# Patient Record
Sex: MALE | Race: WHITE | NOT HISPANIC OR LATINO | Employment: FULL TIME | URBAN - NONMETROPOLITAN AREA
[De-identification: names, ages, dates, MRNs, and addresses within clinical notes are randomized per-mention and may not be internally consistent; named-entity substitution may affect disease eponyms.]

---

## 2018-04-04 ENCOUNTER — APPOINTMENT (OUTPATIENT)
Dept: PREADMISSION TESTING | Facility: HOSPITAL | Age: 58
End: 2018-04-04

## 2018-04-04 ENCOUNTER — CONSULT (OUTPATIENT)
Dept: SURGERY | Facility: CLINIC | Age: 58
End: 2018-04-04

## 2018-04-04 VITALS
BODY MASS INDEX: 27.25 KG/M2 | HEIGHT: 69 IN | SYSTOLIC BLOOD PRESSURE: 126 MMHG | DIASTOLIC BLOOD PRESSURE: 74 MMHG | WEIGHT: 184 LBS

## 2018-04-04 VITALS
HEIGHT: 69 IN | OXYGEN SATURATION: 98 % | BODY MASS INDEX: 27.25 KG/M2 | HEART RATE: 93 BPM | RESPIRATION RATE: 18 BRPM | WEIGHT: 184 LBS | SYSTOLIC BLOOD PRESSURE: 130 MMHG | DIASTOLIC BLOOD PRESSURE: 80 MMHG

## 2018-04-04 DIAGNOSIS — K81.9 CHOLECYSTITIS: ICD-10-CM

## 2018-04-04 DIAGNOSIS — K81.9 CHOLECYSTITIS: Primary | ICD-10-CM

## 2018-04-04 LAB
ALBUMIN SERPL-MCNC: 4.2 G/DL (ref 3.4–4.8)
ALBUMIN/GLOB SERPL: 1.7 G/DL (ref 1.1–1.8)
ALP SERPL-CCNC: 67 U/L (ref 38–126)
ALT SERPL W P-5'-P-CCNC: 35 U/L (ref 21–72)
ANION GAP SERPL CALCULATED.3IONS-SCNC: 16 MMOL/L (ref 5–15)
AST SERPL-CCNC: 22 U/L (ref 17–59)
BILIRUB SERPL-MCNC: 0.3 MG/DL (ref 0.2–1.3)
BUN BLD-MCNC: 15 MG/DL (ref 7–21)
BUN/CREAT SERPL: 14.9 (ref 7–25)
CALCIUM SPEC-SCNC: 9.2 MG/DL (ref 8.4–10.2)
CHLORIDE SERPL-SCNC: 102 MMOL/L (ref 95–110)
CO2 SERPL-SCNC: 24 MMOL/L (ref 22–31)
CREAT BLD-MCNC: 1.01 MG/DL (ref 0.7–1.3)
GFR SERPL CREATININE-BSD FRML MDRD: 76 ML/MIN/1.73 (ref 56–130)
GLOBULIN UR ELPH-MCNC: 2.5 GM/DL (ref 2.3–3.5)
GLUCOSE BLD-MCNC: 112 MG/DL (ref 60–100)
POTASSIUM BLD-SCNC: 3.7 MMOL/L (ref 3.5–5.1)
PROT SERPL-MCNC: 6.7 G/DL (ref 6.3–8.6)
SODIUM BLD-SCNC: 142 MMOL/L (ref 137–145)

## 2018-04-04 PROCEDURE — 36415 COLL VENOUS BLD VENIPUNCTURE: CPT

## 2018-04-04 PROCEDURE — 93010 ELECTROCARDIOGRAM REPORT: CPT | Performed by: INTERNAL MEDICINE

## 2018-04-04 PROCEDURE — 93005 ELECTROCARDIOGRAM TRACING: CPT

## 2018-04-04 PROCEDURE — 80053 COMPREHEN METABOLIC PANEL: CPT | Performed by: SURGERY

## 2018-04-04 PROCEDURE — 99203 OFFICE O/P NEW LOW 30 MIN: CPT | Performed by: SURGERY

## 2018-04-04 RX ORDER — ACETAMINOPHEN 325 MG/1
650 TABLET ORAL ONCE
Status: CANCELLED | OUTPATIENT
Start: 2018-04-06 | End: 2018-04-06

## 2018-04-04 RX ORDER — FLUTICASONE PROPIONATE 50 MCG
2 SPRAY, SUSPENSION (ML) NASAL DAILY
COMMUNITY

## 2018-04-04 RX ORDER — SODIUM CHLORIDE, SODIUM LACTATE, POTASSIUM CHLORIDE, CALCIUM CHLORIDE 600; 310; 30; 20 MG/100ML; MG/100ML; MG/100ML; MG/100ML
100 INJECTION, SOLUTION INTRAVENOUS CONTINUOUS
Status: CANCELLED | OUTPATIENT
Start: 2018-04-06

## 2018-04-04 NOTE — PATIENT INSTRUCTIONS

## 2018-04-04 NOTE — PROGRESS NOTES
Chief Complaint   Patient presents with   • Abdominal Pain     Right upper quadrant pain and gallbladder polyps.        Abdominal Pain   This is a recurrent problem. The current episode started more than 1 month ago. The onset quality is gradual. The problem occurs intermittently. The problem has been gradually worsening. The pain is located in the epigastric region and RUQ. The patient is experiencing no pain. The quality of the pain is cramping and sharp. The abdominal pain does not radiate. Pertinent negatives include no anorexia, arthralgias, constipation, diarrhea, dysuria, fever, frequency, headaches, hematuria, myalgias, nausea or vomiting. Nothing aggravates the pain. The pain is relieved by nothing. He has tried nothing for the symptoms. Prior diagnostic workup includes ultrasound.         Multiple gallbladder polyps with no shadowing gallstones evident.    Otherwise unremarkable right upper quadrant ultrasound.   Result Narrative   Gallbladder/abdomen ultrasound    INDICATION: 57-year-old male with right upper quadrant pain, history of gallbladder polyps.    FINDINGS: Liver echogenicity is homogeneous with no mass or intrahepatic ductal dilatation seen. Numerous small echogenic non-mobile polyps are noted in the gallbladder. No definite shadowing stones, wall thickening or pericholecystic fluid is seen. The   common duct measures 0.3 cm. No sonographic abnormality is seen in the pancreas, the pancreatic tail is partially obscured by overlying bowel gas. Aortic caliber maximally measures 2.6 cm. The spleen measures 10.5 cm with no mass or splenomegaly evident.    The right kidney measures 10.7 cm. The left kidney measures 10.3 cm. No mass, hydronephrosis or shadowing calcifications are seen in the kidneys.    A previous June 2012 right upper quadrant ultrasound demonstrated gallbladder polyps as well.       No past medical history on file.    Past Surgical History:   Procedure Laterality Date   •  COLONOSCOPY     • CYST REMOVAL      ganglion right wrist   • ENDOSCOPY  02/28/2012    48885 (1)    Normal hypopharynx, esophagus, GE junction, duodenum, symmetrical & patent pylorus. Ulcers in antrum have healed. Thereis mild erythema that is present in antrum. Multiple biopsies were performed. 02/28/2012    n/a          Current Outpatient Prescriptions:   •  FISH OIL-KRILL OIL PO, Take 3 capsules by mouth Daily., Disp: , Rfl:   •  fluticasone (FLONASE) 50 MCG/ACT nasal spray, 2 sprays into each nostril Daily., Disp: , Rfl:   •  Ketotifen Fumarate (ALAWAY OP), Administer 1 drop to both eyes 2 (Two) Times a Day., Disp: , Rfl:   •  Multiple Vitamins-Minerals (MULTIVITAMIN MEN 50+ PO), Take 1 tablet by mouth Daily., Disp: , Rfl:   •  Unable to find, Daily. Med Name:calcium magnesium, Disp: , Rfl:     No Known Allergies    No family history on file.    Social History     Social History   • Marital status:      Spouse name: N/A   • Number of children: N/A   • Years of education: N/A     Occupational History   • Not on file.     Social History Main Topics   • Smoking status: Former Smoker     Quit date: 4/4/1988   • Smokeless tobacco: Former User     Quit date: 4/4/1988   • Alcohol use No   • Drug use: No   • Sexual activity: Defer     Other Topics Concern   • Not on file     Social History Narrative   • No narrative on file       Review of Systems   Constitutional: Negative for activity change, appetite change, chills and fever.   HENT: Negative for hearing loss, nosebleeds and trouble swallowing.    Cardiovascular: Negative for chest pain, palpitations and leg swelling.   Gastrointestinal: Positive for abdominal pain. Negative for abdominal distention, anal bleeding, anorexia, blood in stool, constipation, diarrhea, nausea, rectal pain and vomiting.   Endocrine: Negative for cold intolerance, heat intolerance, polydipsia and polyuria.   Genitourinary: Negative for decreased urine volume, difficulty urinating,  dysuria, enuresis, frequency, hematuria and urgency.   Musculoskeletal: Negative for arthralgias, back pain, gait problem, myalgias and neck pain.   Skin: Negative for pallor, rash and wound.   Allergic/Immunologic: Negative for immunocompromised state.   Neurological: Negative for dizziness, seizures, weakness, light-headedness, numbness and headaches.   Psychiatric/Behavioral: Negative for agitation and behavioral problems. The patient is not nervous/anxious.        Physical Exam   Constitutional: He is oriented to person, place, and time. He appears well-developed and well-nourished. No distress.   HENT:   Head: Normocephalic and atraumatic.   Eyes: EOM are normal. Pupils are equal, round, and reactive to light. No scleral icterus.   Neck: Normal range of motion. Neck supple. No JVD present. No tracheal deviation present. No thyromegaly present.   Cardiovascular: Normal rate and regular rhythm.    Pulmonary/Chest: Effort normal and breath sounds normal. No stridor.   Abdominal: Soft. Bowel sounds are normal. He exhibits no distension and no mass. There is no tenderness. There is no rebound and no guarding. No hernia.   Musculoskeletal: Normal range of motion. He exhibits no edema or deformity.   Lymphadenopathy:     He has no cervical adenopathy.   Neurological: He is oriented to person, place, and time.   Skin: Skin is warm and dry. He is not diaphoretic. No erythema.   Psychiatric: He has a normal mood and affect. His behavior is normal. Judgment and thought content normal.   Vitals reviewed.        ASSESSMENT    Manuel was seen today for abdominal pain.    Diagnoses and all orders for this visit:    Cholecystitis  -     Comprehensive Metabolic Panel; Future  -     ECG 12 Lead; Future  -     lactated ringers infusion; Infuse 100 mL/hr into a venous catheter Continuous.  -     ceFAZolin (ANCEF) 2 g in sodium chloride 0.9 % 100 mL IVPB; Infuse 2 g into a venous catheter 1 (One) Time.  -     acetaminophen  (TYLENOL) tablet 650 mg; Take 2 tablets by mouth 1 (One) Time.  -     Case Request; Standing  -     Case Request    Other orders  -     Provide instructions to patient on NPO status  -     Follow anesthesia standing orders.; Standing  -     Follow anesthesia standing orders.  -     Verify NPO Status; Standing  -     Obtain informed consent; Standing  -     SCD (sequential compression device)- to be placed on patient in Pre-op; Standing  -     Clip operative site; Standing        PLAN    1.  Laparoscopic possible open cholecystectomy was planned    What are the indications that have led your doctor to the opinion that an operation is necessary?    * Symptoms and studies indicate that there is gallbladder disease causing pain the abdomen.    What, if any, alternative treatments are available for your condition?    * Watching and waiting with no surgery  * Removing the gallbladder through one large incision made in the abdomen.    What will be the likely result if you don't have the operation?    * Pain, infection, inflammation, and/or stones may continue or get worse    What are the basic procedures involved in the operation?    * The surgery may be done laparoscopically. Laparoscopic surgery is done using a scope and hollow tube(s) called ports. These are inserted through small cuts in the abdomen. A scope is a thin, lighted instrument with a camera attached. Tools are passed through the ports. Carbon dioxide gas is pumped into the abdomen to create workspace.   If the surgery cannot be performed with a scope, it may be done through a larger cut. This occurs 2% of the time.  The gall bladder will be removed after it is  from the liver, bile duct, and surrounding arteries. An x-ray of the bile ducts is usually performed with contrast dye injected into the ducts.  If stones are found, another procedure may be required to remove them.  A drain may rarely be inserted to keep fluid from building up in the  treatment area.     What are the risks?    * Exposure to radiation. Pregnant women and women of childbearing age should talk with their doctor about this.  * Pain, numbness, swelling, weakness or scarring where tissue is cut.  * The gas used in laparoscopic procedures to inflate the abdomen can become trapped in tissues. Gas in the bloodstream can dangerously affect blood flow and  heart function.  * The procedure may not cure or relieve your condition or symptoms. They may come back and even worsen.  * You may need additional tests or treatment.  * Bleeding. You may need blood transfusions or other treatments. This may be discovered during the procedure, or later.  * Embolism. An embolism is an object that moves through your body in your bloodstream. It can be an air bubble, a blood clot, a piece of fat or other material. It can block a blood vessel. This can lead to stroke, pulmonary embolism (blockage of the main artery of the lung), or injury to organs or extremities.  * Reactions to dye used for imaging. These may include hives, swelling of the face and/or throat, difficulty breathing, and kidney failure.  * Retained stones in bile ducts.  * Wound infection, poor healing or reopening. Blood or clear fluid can also collect at the wound site(s).  * Damage to the bile ducts or nearby structures. This may be discovered during the procedure, or later.  * Incisional hernia. Weak scar tissue may allow tissue to bulge through the cut.  * The instrument(s) placed in your abdomen can cause injuries to nearby structures.  * Death.    How is the operation expected to improve your health or quality of life?    * Operation is expected to decrease pain and nausea/vomiting associated with gallstones.  * This procedure may relieve or prevent infection, inflammation, and/or pain from stones or blockage of bile ducts.    Is hospitalization necessary and, if so, how long can you expect to be hospitalized?    * Most often, the  operation is performed on an outpatient basis. Occasionally hospitalization is necessary for pain or nausea control    What can you expect during your recovery period?    * The gas used in laparoscopic procedures to inflate the abdomen can become trapped    in tissues. Shoulder pain may occur for a few days after surgery.   * Nausea and pain control are obtained with oral medication.  * If you are on metformin, it will need to be held for 48 hours after surgery    When can you expect to resume normal activities?    * Normal activity can be resumed in 10-14 days if the procedure is performed laparoscopically. Open operations require no lifting or straining for 6 weeks.     Are there likely to be residual effects from the operation?    * Diarrhea often occurs, mostly temporary. Occasionally there is still minor food intolerance.    All questions were answered. The patient agrees to operation.                This document has been electronically signed by Tyron Gonzalez MD on April 5, 2018 10:49 PM

## 2018-04-04 NOTE — DISCHARGE INSTRUCTIONS
[facility]  Pre-op Information and Guidelines    You will be called after 2 p.m. the day before your surgery (Friday for Monday surgery) and notified of your time for arrival and approximate surgery time.  If you have not received a call by 4P.M., please contact Same Day Surgery at [phone] of if outside [facility] call [phone].    Please Follow these Important Safety Guidelines:    • The morning of your procedure, take only the medications listed below with   A sip of water:_____________________________________________       ______________________________________________    • DO NOT eat or drink anything after 12:00 midnight the night before surgery  Specific instructions concerning drinking clear liquids will be discussed during  the pre-surgery instruction call the day before your surgery.    • If you take a blood thinner (ex. Plavix, Coumadin, aspirin), ask your doctor when to stop it before surgery  STOP DATE: _________________    • Only 2 visitors are allowed in patient rooms at a time  Your visitors will be asked to wait in the lobby until the admission process is complete with the exception of a parent with a child and patients in need of special assistance.    • YOU CANNOT DRIVE YOURSELF HOME  You must be accompanied by someone who will be responsible for driving you home after surgery and for your care at home.    • DO NOT chew gum, use breath mints, hard candy, or smoke the day of surgery  • DO NOT drink alcohol for at least 24 hours before your surgery  • DO NOT wear any jewelry and remove all body piercing before coming to the hospital  • DO NOT wear make-up to the hospital  • If you are having surgery on an extremity (arm/leg/foot) remove nail polish/artificial nails on the surgical side  • Clothing, glasses, contacts, dentures, and hairpieces must be removed before surgery  • Bathe the night before or the morning of your surgery and do not use powders/lotions on  skin.

## 2018-04-06 ENCOUNTER — APPOINTMENT (OUTPATIENT)
Dept: GENERAL RADIOLOGY | Facility: HOSPITAL | Age: 58
End: 2018-04-06

## 2018-04-06 ENCOUNTER — HOSPITAL ENCOUNTER (OUTPATIENT)
Facility: HOSPITAL | Age: 58
Setting detail: HOSPITAL OUTPATIENT SURGERY
Discharge: HOME | End: 2018-04-06
Attending: SURGERY | Admitting: SURGERY

## 2018-04-06 ENCOUNTER — ANESTHESIA (OUTPATIENT)
Dept: PERIOP | Facility: HOSPITAL | Age: 58
End: 2018-04-06

## 2018-04-06 ENCOUNTER — ANESTHESIA EVENT (OUTPATIENT)
Dept: PERIOP | Facility: HOSPITAL | Age: 58
End: 2018-04-06

## 2018-04-06 VITALS
HEIGHT: 69 IN | TEMPERATURE: 97.2 F | DIASTOLIC BLOOD PRESSURE: 67 MMHG | HEART RATE: 72 BPM | OXYGEN SATURATION: 97 % | WEIGHT: 185.63 LBS | RESPIRATION RATE: 18 BRPM | SYSTOLIC BLOOD PRESSURE: 109 MMHG | BODY MASS INDEX: 27.49 KG/M2

## 2018-04-06 DIAGNOSIS — K81.9 CHOLECYSTITIS: ICD-10-CM

## 2018-04-06 PROCEDURE — 47563 LAPARO CHOLECYSTECTOMY/GRAPH: CPT | Performed by: SURGERY

## 2018-04-06 PROCEDURE — 76000 FLUOROSCOPY <1 HR PHYS/QHP: CPT

## 2018-04-06 PROCEDURE — 74300 X-RAY BILE DUCTS/PANCREAS: CPT | Performed by: SURGERY

## 2018-04-06 PROCEDURE — 88304 TISSUE EXAM BY PATHOLOGIST: CPT | Performed by: PATHOLOGY

## 2018-04-06 PROCEDURE — 25010000002 FENTANYL CITRATE (PF) 250 MCG/5ML SOLUTION: Performed by: NURSE ANESTHETIST, CERTIFIED REGISTERED

## 2018-04-06 PROCEDURE — 25010000002 MIDAZOLAM PER 1 MG: Performed by: NURSE ANESTHETIST, CERTIFIED REGISTERED

## 2018-04-06 PROCEDURE — 25010000003 CEFAZOLIN PER 500 MG: Performed by: SURGERY

## 2018-04-06 PROCEDURE — 25010000002 NEOSTIGMINE 10 MG/10ML SOLUTION: Performed by: NURSE ANESTHETIST, CERTIFIED REGISTERED

## 2018-04-06 PROCEDURE — 25010000002 PHENYLEPHRINE PER 1 ML: Performed by: NURSE ANESTHETIST, CERTIFIED REGISTERED

## 2018-04-06 PROCEDURE — 25010000002 IOPAMIDOL 61 % SOLUTION: Performed by: SURGERY

## 2018-04-06 PROCEDURE — 25010000002 PROPOFOL 10 MG/ML EMULSION: Performed by: NURSE ANESTHETIST, CERTIFIED REGISTERED

## 2018-04-06 PROCEDURE — 25010000002 DEXAMETHASONE PER 1 MG: Performed by: NURSE ANESTHETIST, CERTIFIED REGISTERED

## 2018-04-06 PROCEDURE — 88304 TISSUE EXAM BY PATHOLOGIST: CPT | Performed by: SURGERY

## 2018-04-06 PROCEDURE — 25010000002 ONDANSETRON PER 1 MG: Performed by: NURSE ANESTHETIST, CERTIFIED REGISTERED

## 2018-04-06 RX ORDER — FLUMAZENIL 0.1 MG/ML
0.2 INJECTION INTRAVENOUS AS NEEDED
Status: DISCONTINUED | OUTPATIENT
Start: 2018-04-06 | End: 2018-04-06 | Stop reason: HOSPADM

## 2018-04-06 RX ORDER — DEXAMETHASONE SODIUM PHOSPHATE 4 MG/ML
INJECTION, SOLUTION INTRA-ARTICULAR; INTRALESIONAL; INTRAMUSCULAR; INTRAVENOUS; SOFT TISSUE AS NEEDED
Status: DISCONTINUED | OUTPATIENT
Start: 2018-04-06 | End: 2018-04-06 | Stop reason: SURG

## 2018-04-06 RX ORDER — GLYCOPYRROLATE 0.2 MG/ML
INJECTION INTRAMUSCULAR; INTRAVENOUS AS NEEDED
Status: DISCONTINUED | OUTPATIENT
Start: 2018-04-06 | End: 2018-04-06 | Stop reason: SURG

## 2018-04-06 RX ORDER — LABETALOL HYDROCHLORIDE 5 MG/ML
5 INJECTION, SOLUTION INTRAVENOUS
Status: DISCONTINUED | OUTPATIENT
Start: 2018-04-06 | End: 2018-04-06 | Stop reason: HOSPADM

## 2018-04-06 RX ORDER — ROCURONIUM BROMIDE 10 MG/ML
INJECTION, SOLUTION INTRAVENOUS AS NEEDED
Status: DISCONTINUED | OUTPATIENT
Start: 2018-04-06 | End: 2018-04-06 | Stop reason: SURG

## 2018-04-06 RX ORDER — ACETAMINOPHEN 325 MG/1
650 TABLET ORAL ONCE
Status: COMPLETED | OUTPATIENT
Start: 2018-04-06 | End: 2018-04-06

## 2018-04-06 RX ORDER — EPHEDRINE SULFATE 50 MG/ML
5 INJECTION, SOLUTION INTRAVENOUS ONCE AS NEEDED
Status: DISCONTINUED | OUTPATIENT
Start: 2018-04-06 | End: 2018-04-06 | Stop reason: HOSPADM

## 2018-04-06 RX ORDER — SODIUM CHLORIDE, SODIUM LACTATE, POTASSIUM CHLORIDE, CALCIUM CHLORIDE 600; 310; 30; 20 MG/100ML; MG/100ML; MG/100ML; MG/100ML
100 INJECTION, SOLUTION INTRAVENOUS CONTINUOUS
Status: DISCONTINUED | OUTPATIENT
Start: 2018-04-06 | End: 2018-04-06 | Stop reason: HOSPADM

## 2018-04-06 RX ORDER — FENTANYL CITRATE 50 UG/ML
INJECTION, SOLUTION INTRAMUSCULAR; INTRAVENOUS AS NEEDED
Status: DISCONTINUED | OUTPATIENT
Start: 2018-04-06 | End: 2018-04-06 | Stop reason: SURG

## 2018-04-06 RX ORDER — PROPOFOL 10 MG/ML
VIAL (ML) INTRAVENOUS AS NEEDED
Status: DISCONTINUED | OUTPATIENT
Start: 2018-04-06 | End: 2018-04-06 | Stop reason: SURG

## 2018-04-06 RX ORDER — HYDROCODONE BITARTRATE AND ACETAMINOPHEN 7.5; 325 MG/1; MG/1
1 TABLET ORAL EVERY 6 HOURS PRN
Qty: 20 TABLET | Refills: 0 | Status: SHIPPED | OUTPATIENT
Start: 2018-04-06 | End: 2020-02-04

## 2018-04-06 RX ORDER — DIPHENHYDRAMINE HYDROCHLORIDE 50 MG/ML
12.5 INJECTION INTRAMUSCULAR; INTRAVENOUS
Status: DISCONTINUED | OUTPATIENT
Start: 2018-04-06 | End: 2018-04-06 | Stop reason: HOSPADM

## 2018-04-06 RX ORDER — ACETAMINOPHEN 650 MG/1
650 SUPPOSITORY RECTAL ONCE AS NEEDED
Status: DISCONTINUED | OUTPATIENT
Start: 2018-04-06 | End: 2018-04-06 | Stop reason: HOSPADM

## 2018-04-06 RX ORDER — ONDANSETRON 2 MG/ML
INJECTION INTRAMUSCULAR; INTRAVENOUS AS NEEDED
Status: DISCONTINUED | OUTPATIENT
Start: 2018-04-06 | End: 2018-04-06 | Stop reason: SURG

## 2018-04-06 RX ORDER — NALOXONE HCL 0.4 MG/ML
0.2 VIAL (ML) INJECTION AS NEEDED
Status: DISCONTINUED | OUTPATIENT
Start: 2018-04-06 | End: 2018-04-06 | Stop reason: HOSPADM

## 2018-04-06 RX ORDER — MEPERIDINE HYDROCHLORIDE 50 MG/ML
12.5 INJECTION INTRAMUSCULAR; INTRAVENOUS; SUBCUTANEOUS
Status: DISCONTINUED | OUTPATIENT
Start: 2018-04-06 | End: 2018-04-06 | Stop reason: HOSPADM

## 2018-04-06 RX ORDER — ACETAMINOPHEN 325 MG/1
650 TABLET ORAL ONCE AS NEEDED
Status: DISCONTINUED | OUTPATIENT
Start: 2018-04-06 | End: 2018-04-06 | Stop reason: HOSPADM

## 2018-04-06 RX ORDER — BUPIVACAINE HYDROCHLORIDE AND EPINEPHRINE 5; 5 MG/ML; UG/ML
INJECTION, SOLUTION EPIDURAL; INTRACAUDAL; PERINEURAL AS NEEDED
Status: DISCONTINUED | OUTPATIENT
Start: 2018-04-06 | End: 2018-04-06 | Stop reason: HOSPADM

## 2018-04-06 RX ORDER — ONDANSETRON 2 MG/ML
4 INJECTION INTRAMUSCULAR; INTRAVENOUS ONCE AS NEEDED
Status: DISCONTINUED | OUTPATIENT
Start: 2018-04-06 | End: 2018-04-06 | Stop reason: HOSPADM

## 2018-04-06 RX ORDER — LIDOCAINE HYDROCHLORIDE 20 MG/ML
INJECTION, SOLUTION INFILTRATION; PERINEURAL AS NEEDED
Status: DISCONTINUED | OUTPATIENT
Start: 2018-04-06 | End: 2018-04-06 | Stop reason: SURG

## 2018-04-06 RX ORDER — NEOSTIGMINE METHYLSULFATE 1 MG/ML
INJECTION, SOLUTION INTRAVENOUS AS NEEDED
Status: DISCONTINUED | OUTPATIENT
Start: 2018-04-06 | End: 2018-04-06 | Stop reason: SURG

## 2018-04-06 RX ORDER — MIDAZOLAM HYDROCHLORIDE 1 MG/ML
INJECTION INTRAMUSCULAR; INTRAVENOUS AS NEEDED
Status: DISCONTINUED | OUTPATIENT
Start: 2018-04-06 | End: 2018-04-06 | Stop reason: SURG

## 2018-04-06 RX ADMIN — CEFAZOLIN SODIUM 2 G: 1 INJECTION, POWDER, FOR SOLUTION INTRAMUSCULAR; INTRAVENOUS at 13:35

## 2018-04-06 RX ADMIN — LIDOCAINE HYDROCHLORIDE 80 MG: 20 INJECTION, SOLUTION INFILTRATION; PERINEURAL at 13:30

## 2018-04-06 RX ADMIN — DEXAMETHASONE SODIUM PHOSPHATE 4 MG: 4 INJECTION, SOLUTION INTRAMUSCULAR; INTRAVENOUS at 13:35

## 2018-04-06 RX ADMIN — FENTANYL CITRATE 100 MCG: 50 INJECTION, SOLUTION INTRAMUSCULAR; INTRAVENOUS at 13:24

## 2018-04-06 RX ADMIN — HYDROMORPHONE HYDROCHLORIDE 0.5 MG: 10 INJECTION, SOLUTION INTRAMUSCULAR; INTRAVENOUS; SUBCUTANEOUS at 15:23

## 2018-04-06 RX ADMIN — ACETAMINOPHEN 650 MG: 325 TABLET ORAL at 11:48

## 2018-04-06 RX ADMIN — FENTANYL CITRATE 50 MCG: 50 INJECTION, SOLUTION INTRAMUSCULAR; INTRAVENOUS at 14:26

## 2018-04-06 RX ADMIN — MIDAZOLAM 2 MG: 1 INJECTION INTRAMUSCULAR; INTRAVENOUS at 13:23

## 2018-04-06 RX ADMIN — ROCURONIUM BROMIDE 40 MG: 10 INJECTION INTRAVENOUS at 13:31

## 2018-04-06 RX ADMIN — PROPOFOL 150 MG: 10 INJECTION, EMULSION INTRAVENOUS at 13:30

## 2018-04-06 RX ADMIN — FENTANYL CITRATE 50 MCG: 50 INJECTION, SOLUTION INTRAMUSCULAR; INTRAVENOUS at 14:12

## 2018-04-06 RX ADMIN — HYDROMORPHONE HYDROCHLORIDE 0.5 MG: 10 INJECTION, SOLUTION INTRAMUSCULAR; INTRAVENOUS; SUBCUTANEOUS at 15:13

## 2018-04-06 RX ADMIN — SODIUM CHLORIDE, POTASSIUM CHLORIDE, SODIUM LACTATE AND CALCIUM CHLORIDE 100 ML/HR: 600; 310; 30; 20 INJECTION, SOLUTION INTRAVENOUS at 11:36

## 2018-04-06 RX ADMIN — PHENYLEPHRINE HYDROCHLORIDE 100 MCG: 10 INJECTION INTRAVENOUS at 13:55

## 2018-04-06 RX ADMIN — NEOSTIGMINE METHYLSULFATE 3 MG: 1 INJECTION, SOLUTION INTRAVENOUS at 14:50

## 2018-04-06 RX ADMIN — ONDANSETRON 4 MG: 2 INJECTION INTRAMUSCULAR; INTRAVENOUS at 14:50

## 2018-04-06 RX ADMIN — GLYCOPYRROLATE 0.6 MG: 0.2 INJECTION, SOLUTION INTRAMUSCULAR; INTRAVENOUS at 14:50

## 2018-04-06 RX ADMIN — FENTANYL CITRATE 50 MCG: 50 INJECTION, SOLUTION INTRAMUSCULAR; INTRAVENOUS at 13:47

## 2018-04-06 NOTE — ANESTHESIA PREPROCEDURE EVALUATION
" Anesthesia Evaluation     NPO Solid Status: > 8 hours  NPO Liquid Status: > 8 hours           Airway   Mallampati: II  TM distance: >3 FB  Neck ROM: full  No difficulty expected  Dental    (+) poor dentition    Pulmonary     breath sounds clear to auscultation  (+) a smoker Former,   Cardiovascular     Rhythm: regular  Rate: normal        Neuro/Psych  (+) psychiatric history Anxiety,     GI/Hepatic/Renal/Endo    (+)  GERD well controlled,      Musculoskeletal     Abdominal     Abdomen: tender.   Substance History      OB/GYN          Other                      Anesthesia Plan    ASA 2     general   (Patient asked about symptoms of reflux, he states that \"many years ago when I had ulcers\" he had acid problems, but none in the last year.)  intravenous induction   Anesthetic plan and risks discussed with patient.      "

## 2018-04-06 NOTE — DISCHARGE INSTRUCTIONS
Dr. Tyron Gonzalez  [address] [place] [address]  [phone] (office)  [phone] (hospital)  [phone] (cell)  Discharge Instructions for Gall Bladder Surgery      1. Go home, rest and take it easy today; however, you should get up and move about several times today to reduce the risk of developing a clot in your legs.    2. You may experience some dizziness or memory loss from the anesthesia.  This may last for the next 24 hours.  Someone should plan on staying with you for the first 24 hours for your safety.  3. Do not make any important legal decisions or sign any legal papers for the next 24 hours.    4. Eat and drink lightly today.  Start off with liquids, jello, soup, crackers or other bland foods at first. You may advance your diet tomorrow as tolerated as long as you do not experience any nausea or vomiting.   5. You may remove your outer dressings in 3 days.  The white tapes called steri-strips should stay in place.  They will fall off on their own in 1-2 weeks.  Do not worry if they come off sooner.    6. You may notice some bleeding/drainage on your outer dressings. A little bloody drainage is normal. If the bleeding/drainage is such that the bandage cannot absorb it, remove the dressing, apply clean gauze and apply firm pressure for a full 15 minutes.  If the bleeding continues, please call me.  7. You may shower tomorrow.  No tub baths for at least 1 week until your incisions are completely healed.       8. You have received a prescription for a narcotic pain medicine, as you will have some pain following surgery.   You will not be totally pain free, but your pain medicine should make the pain tolerable.  Please take your pain medicine as prescribed and always take your pills with food to prevent nausea. If you are having severe pain that cannot be controlled by the pain medicine, please contact me.    9. If needed, you may receive a prescription for an anti-nausea medicine.  Please take  this as prescribed for any nausea or vomiting.  Nausea could be a result of the anesthesia or a result of the narcotic pain medicine.  If you experience severe nausea and vomiting that cannot be controlled by the nausea medicine, please call me.    10. It is not unusual to experience pain/discomfort in your shoulders or under your ribs after surgery.  It is from the gas used during the laparoscopic procedure and usually lasts 1-3 days.  The prescription pain medicine is used to treat the surgical pain and does not typically alleviate this “gassy” pain.   11. No driving for 24 hours and for as long as you are taking your prescription pain medicine.      12. . If an appointment is not given to you before discharge, you will need to call the office       at [phone] to schedule a follow-up appointment in 5-7 days.  13. Remember to contact me for any of the following:    • Fever > 101 degrees  • Severe pain that cannot be controlled by taking your pain pills  • Severe nausea or vomiting that cannot be controlled by taking your nausea pills  • Significant bleeding of your incisions  • Drainage that has a bad smell or is yellow or green in appearance  • Any other questions or concerns

## 2018-04-06 NOTE — OP NOTE
CHOLECYSTECTOMY LAPAROSCOPIC INTRAOPERATIVE CHOLANGIOGRAM  Procedure Note    Manuel Guzman  4/6/2018    Pre-op Diagnosis:   Cholecystitis [K81.9]    Post-op Diagnosis:     Post-Op Diagnosis Codes:     * Cholecystitis [K81.9]    Procedure/CPT® Codes:      Procedure(s):  LAPAROSCOPIC CHOLECYSTECTOMY WITH INTRAOPERATIVE CHOLANGIOGRAM         (C-ARM#1)    Surgeon(s):  Tyron Gonzalez MD    Anesthesia: General    Staff:   Circulator: Alissa Tenorio RN; Connie Chow RN  Scrub Person: Bernard Pradhan V  Assistant: Saumya Francis CSA    Estimated Blood Loss: minimal    Specimens:                ID Type Source Tests Collected by Time   A : and contents Tissue Gallbladder TISSUE PATHOLOGY EXAM Tyron Gonzalez MD 4/6/2018 1253         Drains:  None    Findings: Small stones, nl IOC    Complications: None    INDICATION:  This is a 57-year-old with epigastric and right upper quadrant abdominal pain. Positive  ultrasound for polyps. Taken to the operating room for laparoscopic cholecystectomy.    DESCRIPTION:  The patient was brought to the operating room and placed supine on the operating table. After adequate general endotracheal anesthesia, the abdominal area was prepped and draped in a sterile manner  A briefing and time out were performed and all parties were in agreement.      A transverse incision was made slightly to the right of the midline in the epigastrium subcostally. A 5 mm XCEL trocar was uneventfully passed into the abdomen under direct vision with no visceral injury. The abdomen was insufflated to a total of 15 mmHg, 4.8 L of CO2. A 5 mm laparoscope revealed an excellent view of the upper and lower abdominal areas. A longitudinal skin incision was made at the umbilicus and a 5 mm trocar was placed under direct vision with no visceral injury. The camera was then moved to the umbilical port site and an excellent view of the upper abdomen was obtained.     An incision was made at the tip of the 12th rib on the  right side and carried into the subcutaneous tissue. A 5 mm XCEL trocar was uneventfully passed into the abdomen under direct vision with no visceral injury. The bed was then tilted in reverse Trendelenburg and tipped to the left. The patient tolerated the positioning and insufflation without difficulty.     The gallbladder was retracted upwards and outwards by grasping the infundibulum with an atraumatic grasper passed through the lateral port. The peritoneum around the infundibulum was taken down for a distance of 1/3 of the length of the gallbladder on the anterior and posterior sides. The cystic duct and artery easily came into view as did the 5th segment of the liver behind these structures.     A Mcgill clamp was placed across the infundibulum and a fluoroscopic cholangiogram was performed by piercing the infundibulum with a needle and injecting contrast. This demonstrated good filling proximally and distally, intact bile ducts, no stones in the common duct, and good emptying into the duodenum.     The cholangiocath was removed and the cystic duct was doubly clipped and divided. The cystic artery was doubly clipped and divided in all branches. The gallbladder was then dissected out of the liver bed using electrocautery. Once it had been nearly completely excised, pressure in the abdomen was reduced to 8 mmHg with no further bleeding being noted from the liver bed. The remainder of the gallbladder was dissected free.  It was placed into an Endobag and brought out intact through the epigastric port. All areas were visualized for bleeding and bile leak. None was seen.     The patient was then placed supine. The abdominal area below all port sites was visualized and no visceral injury was identified.    Trocars were removed and the abdomen was allowed to flatten. All port sites were closed with 4-0 subcuticular on the skin and the procedure was terminated. The procedure was tolerated well. Sponge and needle counts  were correct, and the patient was transferred to recovery in satisfactory condition.          This document has been electronically signed by Tyron Gonzalez MD on April 6, 2018 3:19 PM       Date:  4/6/2018  Time: 3:19 PM

## 2018-04-06 NOTE — H&P (VIEW-ONLY)
Chief Complaint   Patient presents with   • Abdominal Pain     Right upper quadrant pain and gallbladder polyps.        Abdominal Pain   This is a recurrent problem. The current episode started more than 1 month ago. The onset quality is gradual. The problem occurs intermittently. The problem has been gradually worsening. The pain is located in the epigastric region and RUQ. The patient is experiencing no pain. The quality of the pain is cramping and sharp. The abdominal pain does not radiate. Pertinent negatives include no anorexia, arthralgias, constipation, diarrhea, dysuria, fever, frequency, headaches, hematuria, myalgias, nausea or vomiting. Nothing aggravates the pain. The pain is relieved by nothing. He has tried nothing for the symptoms. Prior diagnostic workup includes ultrasound.         Multiple gallbladder polyps with no shadowing gallstones evident.    Otherwise unremarkable right upper quadrant ultrasound.   Result Narrative   Gallbladder/abdomen ultrasound    INDICATION: 57-year-old male with right upper quadrant pain, history of gallbladder polyps.    FINDINGS: Liver echogenicity is homogeneous with no mass or intrahepatic ductal dilatation seen. Numerous small echogenic non-mobile polyps are noted in the gallbladder. No definite shadowing stones, wall thickening or pericholecystic fluid is seen. The   common duct measures 0.3 cm. No sonographic abnormality is seen in the pancreas, the pancreatic tail is partially obscured by overlying bowel gas. Aortic caliber maximally measures 2.6 cm. The spleen measures 10.5 cm with no mass or splenomegaly evident.    The right kidney measures 10.7 cm. The left kidney measures 10.3 cm. No mass, hydronephrosis or shadowing calcifications are seen in the kidneys.    A previous June 2012 right upper quadrant ultrasound demonstrated gallbladder polyps as well.       No past medical history on file.    Past Surgical History:   Procedure Laterality Date   •  COLONOSCOPY     • CYST REMOVAL      ganglion right wrist   • ENDOSCOPY  02/28/2012    15588 (1)    Normal hypopharynx, esophagus, GE junction, duodenum, symmetrical & patent pylorus. Ulcers in antrum have healed. Thereis mild erythema that is present in antrum. Multiple biopsies were performed. 02/28/2012    n/a          Current Outpatient Prescriptions:   •  FISH OIL-KRILL OIL PO, Take 3 capsules by mouth Daily., Disp: , Rfl:   •  fluticasone (FLONASE) 50 MCG/ACT nasal spray, 2 sprays into each nostril Daily., Disp: , Rfl:   •  Ketotifen Fumarate (ALAWAY OP), Administer 1 drop to both eyes 2 (Two) Times a Day., Disp: , Rfl:   •  Multiple Vitamins-Minerals (MULTIVITAMIN MEN 50+ PO), Take 1 tablet by mouth Daily., Disp: , Rfl:   •  Unable to find, Daily. Med Name:calcium magnesium, Disp: , Rfl:     No Known Allergies    No family history on file.    Social History     Social History   • Marital status:      Spouse name: N/A   • Number of children: N/A   • Years of education: N/A     Occupational History   • Not on file.     Social History Main Topics   • Smoking status: Former Smoker     Quit date: 4/4/1988   • Smokeless tobacco: Former User     Quit date: 4/4/1988   • Alcohol use No   • Drug use: No   • Sexual activity: Defer     Other Topics Concern   • Not on file     Social History Narrative   • No narrative on file       Review of Systems   Constitutional: Negative for activity change, appetite change, chills and fever.   HENT: Negative for hearing loss, nosebleeds and trouble swallowing.    Cardiovascular: Negative for chest pain, palpitations and leg swelling.   Gastrointestinal: Positive for abdominal pain. Negative for abdominal distention, anal bleeding, anorexia, blood in stool, constipation, diarrhea, nausea, rectal pain and vomiting.   Endocrine: Negative for cold intolerance, heat intolerance, polydipsia and polyuria.   Genitourinary: Negative for decreased urine volume, difficulty urinating,  dysuria, enuresis, frequency, hematuria and urgency.   Musculoskeletal: Negative for arthralgias, back pain, gait problem, myalgias and neck pain.   Skin: Negative for pallor, rash and wound.   Allergic/Immunologic: Negative for immunocompromised state.   Neurological: Negative for dizziness, seizures, weakness, light-headedness, numbness and headaches.   Psychiatric/Behavioral: Negative for agitation and behavioral problems. The patient is not nervous/anxious.        Physical Exam   Constitutional: He is oriented to person, place, and time. He appears well-developed and well-nourished. No distress.   HENT:   Head: Normocephalic and atraumatic.   Eyes: EOM are normal. Pupils are equal, round, and reactive to light. No scleral icterus.   Neck: Normal range of motion. Neck supple. No JVD present. No tracheal deviation present. No thyromegaly present.   Cardiovascular: Normal rate and regular rhythm.    Pulmonary/Chest: Effort normal and breath sounds normal. No stridor.   Abdominal: Soft. Bowel sounds are normal. He exhibits no distension and no mass. There is no tenderness. There is no rebound and no guarding. No hernia.   Musculoskeletal: Normal range of motion. He exhibits no edema or deformity.   Lymphadenopathy:     He has no cervical adenopathy.   Neurological: He is oriented to person, place, and time.   Skin: Skin is warm and dry. He is not diaphoretic. No erythema.   Psychiatric: He has a normal mood and affect. His behavior is normal. Judgment and thought content normal.   Vitals reviewed.        ASSESSMENT    Manuel was seen today for abdominal pain.    Diagnoses and all orders for this visit:    Cholecystitis  -     Comprehensive Metabolic Panel; Future  -     ECG 12 Lead; Future  -     lactated ringers infusion; Infuse 100 mL/hr into a venous catheter Continuous.  -     ceFAZolin (ANCEF) 2 g in sodium chloride 0.9 % 100 mL IVPB; Infuse 2 g into a venous catheter 1 (One) Time.  -     acetaminophen  (TYLENOL) tablet 650 mg; Take 2 tablets by mouth 1 (One) Time.  -     Case Request; Standing  -     Case Request    Other orders  -     Provide instructions to patient on NPO status  -     Follow anesthesia standing orders.; Standing  -     Follow anesthesia standing orders.  -     Verify NPO Status; Standing  -     Obtain informed consent; Standing  -     SCD (sequential compression device)- to be placed on patient in Pre-op; Standing  -     Clip operative site; Standing        PLAN    1.  Laparoscopic possible open cholecystectomy was planned    What are the indications that have led your doctor to the opinion that an operation is necessary?    * Symptoms and studies indicate that there is gallbladder disease causing pain the abdomen.    What, if any, alternative treatments are available for your condition?    * Watching and waiting with no surgery  * Removing the gallbladder through one large incision made in the abdomen.    What will be the likely result if you don't have the operation?    * Pain, infection, inflammation, and/or stones may continue or get worse    What are the basic procedures involved in the operation?    * The surgery may be done laparoscopically. Laparoscopic surgery is done using a scope and hollow tube(s) called ports. These are inserted through small cuts in the abdomen. A scope is a thin, lighted instrument with a camera attached. Tools are passed through the ports. Carbon dioxide gas is pumped into the abdomen to create workspace.   If the surgery cannot be performed with a scope, it may be done through a larger cut. This occurs 2% of the time.  The gall bladder will be removed after it is  from the liver, bile duct, and surrounding arteries. An x-ray of the bile ducts is usually performed with contrast dye injected into the ducts.  If stones are found, another procedure may be required to remove them.  A drain may rarely be inserted to keep fluid from building up in the  treatment area.     What are the risks?    * Exposure to radiation. Pregnant women and women of childbearing age should talk with their doctor about this.  * Pain, numbness, swelling, weakness or scarring where tissue is cut.  * The gas used in laparoscopic procedures to inflate the abdomen can become trapped in tissues. Gas in the bloodstream can dangerously affect blood flow and  heart function.  * The procedure may not cure or relieve your condition or symptoms. They may come back and even worsen.  * You may need additional tests or treatment.  * Bleeding. You may need blood transfusions or other treatments. This may be discovered during the procedure, or later.  * Embolism. An embolism is an object that moves through your body in your bloodstream. It can be an air bubble, a blood clot, a piece of fat or other material. It can block a blood vessel. This can lead to stroke, pulmonary embolism (blockage of the main artery of the lung), or injury to organs or extremities.  * Reactions to dye used for imaging. These may include hives, swelling of the face and/or throat, difficulty breathing, and kidney failure.  * Retained stones in bile ducts.  * Wound infection, poor healing or reopening. Blood or clear fluid can also collect at the wound site(s).  * Damage to the bile ducts or nearby structures. This may be discovered during the procedure, or later.  * Incisional hernia. Weak scar tissue may allow tissue to bulge through the cut.  * The instrument(s) placed in your abdomen can cause injuries to nearby structures.  * Death.    How is the operation expected to improve your health or quality of life?    * Operation is expected to decrease pain and nausea/vomiting associated with gallstones.  * This procedure may relieve or prevent infection, inflammation, and/or pain from stones or blockage of bile ducts.    Is hospitalization necessary and, if so, how long can you expect to be hospitalized?    * Most often, the  operation is performed on an outpatient basis. Occasionally hospitalization is necessary for pain or nausea control    What can you expect during your recovery period?    * The gas used in laparoscopic procedures to inflate the abdomen can become trapped    in tissues. Shoulder pain may occur for a few days after surgery.   * Nausea and pain control are obtained with oral medication.  * If you are on metformin, it will need to be held for 48 hours after surgery    When can you expect to resume normal activities?    * Normal activity can be resumed in 10-14 days if the procedure is performed laparoscopically. Open operations require no lifting or straining for 6 weeks.     Are there likely to be residual effects from the operation?    * Diarrhea often occurs, mostly temporary. Occasionally there is still minor food intolerance.    All questions were answered. The patient agrees to operation.                This document has been electronically signed by Tyron Gonzalez MD on April 5, 2018 10:49 PM

## 2018-04-06 NOTE — ANESTHESIA POSTPROCEDURE EVALUATION
Patient: Manuel Guzman    Procedure Summary     Date:  04/06/18 Room / Location:  [facility] OR 09 / [facility] OR    Anesthesia Start:  1323 Anesthesia Stop:  1511    Procedure:  LAPAROSCOPIC POSSIBLE OPEN CHOLECYSTECTOMY WITH INTRAOPERATIVE CHOLANGIOGRAM         (C-ARM#1) (N/A Abdomen) Diagnosis:       Cholecystitis      (Cholecystitis [K81.9])    Surgeon:  Tyron Gonzalez MD Provider:  Rajeev Mackey MD    Anesthesia Type:  general ASA Status:  2          Anesthesia Type: general  Last vitals  BP   129/79 (04/06/18 1147)   Temp   96.7 °F (35.9 °C) (04/06/18 1147)   Pulse   72 (04/06/18 1147)   Resp   18 (04/06/18 1147)     SpO2   100 % (04/06/18 1147)     Post Anesthesia Care and Evaluation    Patient location during evaluation: PACU  Patient participation: complete - patient participated  Level of consciousness: awake and awake and alert  Pain management: adequate  Airway patency: patent  Anesthetic complications: No anesthetic complications    Cardiovascular status: acceptable  Respiratory status: acceptable  Hydration status: acceptable

## 2018-04-06 NOTE — ANESTHESIA PROCEDURE NOTES
Airway  Urgency: elective    Airway not difficult    General Information and Staff    Patient location during procedure: OR  CRNA: LIZA HOLDEN    Indications and Patient Condition    Preoxygenated: yes  Mask difficulty assessment: 1 - vent by mask    Final Airway Details  Final airway type: endotracheal airway      Successful airway: ETT  Cuffed: yes   Successful intubation technique: direct laryngoscopy  Facilitating devices/methods: intubating stylet  Endotracheal tube insertion site: oral  Blade: Mcmahon  Blade size: #3  ETT size: 7.5 mm  Cormack-Lehane Classification: grade IIa - partial view of glottis  Placement verified by: chest auscultation and capnometry   Cuff volume (mL): 10  Measured from: lips  ETT to lips (cm): 21  Number of attempts at approach: 1    Additional Comments  Lips and teeth in preanesthetic condition

## 2018-04-09 LAB
LAB AP CASE REPORT: NORMAL
Lab: NORMAL
PATH REPORT.FINAL DX SPEC: NORMAL
PATH REPORT.GROSS SPEC: NORMAL

## 2018-04-13 ENCOUNTER — OFFICE VISIT (OUTPATIENT)
Dept: SURGERY | Facility: CLINIC | Age: 58
End: 2018-04-13

## 2018-04-13 VITALS
BODY MASS INDEX: 26.82 KG/M2 | SYSTOLIC BLOOD PRESSURE: 120 MMHG | HEIGHT: 69 IN | WEIGHT: 181.1 LBS | DIASTOLIC BLOOD PRESSURE: 80 MMHG

## 2018-04-13 DIAGNOSIS — Z90.49 S/P LAPAROSCOPIC CHOLECYSTECTOMY: Primary | ICD-10-CM

## 2018-04-13 PROCEDURE — 99024 POSTOP FOLLOW-UP VISIT: CPT | Performed by: SURGERY

## 2018-04-13 NOTE — PATIENT INSTRUCTIONS

## 2018-04-21 NOTE — PROGRESS NOTES
CHIEF COMPLAINT:   Chief Complaint   Patient presents with   • Post-op Follow-up     Post operative laparoscopic cholecystectomy 4-6-18.       HPI: This patient presents for a post-operative visit after undergoing a laparoscopic cholecystectomy.  Patient reports no problems. Eating well without any significant nausea. Having good bowel function. No problems with constipation or diarrhea. No urinary complaints. Denies fever. Ambulating well and slowly returning to normal activities.    PATHOLOGY:     Tissue Pathology Exam   Order: [id number]   Status:  Final result   Visible to patient:  No (Not Released) Dx:  Cholecystitis    2wk ago   Final Diagnosis   GALLBLADDER:  CHRONIC CHOLECYSTITIS.  CHOLESTEROLOSIS.   Electronically signed by Ki Bonilla MD on 4/9/2018 at 0952   Gross Description See result below    The specimen measures 8.0 cm in length and 3.0 cm in maximum diameter.  The serosal surface is tan to green and wrinkled.  The mucosal surface is dark green, and the wall measures up to 0.2 cm in thickness.  Some polypoid, yellow lesions measure up to 0.5 cm in maximum diameter.  Sections are submitted in 1 block.  No stones are identified in the gallbladder or the container.             PHYSICAL EXAM:    ABD: Incisions are healing well without any erythema or signs of infection.    ASSESSMENT:    Manuel was seen today for post-op follow-up.    Diagnoses and all orders for this visit:    S/P laparoscopic cholecystectomy        PLAN:    1.The patient will follow-up on a prn basis unless there are any problems.  2. May shower.   3. May return to normal activity without restrictions.          This document has been electronically signed by Tyron Gonzalez MD on April 21, 2018 10:59 AM

## 2020-02-03 DIAGNOSIS — Z00.00 GENERAL MEDICAL EXAM: Primary | ICD-10-CM

## 2020-02-04 ENCOUNTER — OFFICE VISIT (OUTPATIENT)
Dept: CARDIOLOGY | Facility: CLINIC | Age: 60
End: 2020-02-04

## 2020-02-04 ENCOUNTER — TELEPHONE (OUTPATIENT)
Dept: CARDIOLOGY | Facility: CLINIC | Age: 60
End: 2020-02-04

## 2020-02-04 VITALS
DIASTOLIC BLOOD PRESSURE: 82 MMHG | SYSTOLIC BLOOD PRESSURE: 140 MMHG | BODY MASS INDEX: 26.81 KG/M2 | HEIGHT: 69 IN | HEART RATE: 86 BPM | OXYGEN SATURATION: 100 % | WEIGHT: 181 LBS

## 2020-02-04 DIAGNOSIS — R00.2 PALPITATIONS: ICD-10-CM

## 2020-02-04 DIAGNOSIS — R07.2 PRECORDIAL PAIN: Primary | ICD-10-CM

## 2020-02-04 PROCEDURE — 99204 OFFICE O/P NEW MOD 45 MIN: CPT | Performed by: INTERNAL MEDICINE

## 2020-02-04 PROCEDURE — 93000 ELECTROCARDIOGRAM COMPLETE: CPT | Performed by: INTERNAL MEDICINE

## 2020-02-04 RX ORDER — PLANT STANOL ESTER 450 MG
TABLET ORAL
COMMUNITY

## 2020-02-04 RX ORDER — CALCIUM CARBONATE 260MG(650)
5 TABLET,CHEWABLE ORAL
COMMUNITY

## 2020-02-04 RX ORDER — CHOLECALCIFEROL (VITAMIN D3) 125 MCG
5000 CAPSULE ORAL DAILY
COMMUNITY

## 2020-02-04 RX ORDER — OMEPRAZOLE 20 MG/1
20 CAPSULE, DELAYED RELEASE ORAL DAILY
COMMUNITY
End: 2022-01-29

## 2020-02-04 NOTE — TELEPHONE ENCOUNTER
----- Message from Joyce Magana sent at 2/4/2020  4:27 PM CST -----  Seen today in [place] and he thought he had his lab results from awhile back done somewhere else, but he can't find. Can you have Dr Jarrett put in lab orders and he will have done when he comes in to do his other testing  (he lives here but needed to be seen sooner so he went to [place])

## 2020-02-04 NOTE — PROGRESS NOTES
[facility] Cardiology  OFFICE CONSULT NOTE    Patient Name: Manuel Guzman  Age/Sex: 59 y.o. male  : 1960  MRN: [id number]      Referring Provider: Monster Mcdowell MD  [address] [place] [address]        Chief Complaint: Palpitations, chest pain    History of Present Illness:  Manuel Guzman is a 59 y.o. male who is been noticing his heart skipping beats.  He said sometimes he will race.  It can her occur at bedtime which can occur during the day.  Nothing really seems to make it better or worse.  It just seems to happen.  Associated with the palpitations is some shortness of breath.  He denies any syncope or presyncope.        He also notes that he has some chest pain.  He says this is pressure-like usually fleeting.  Can sometimes goes toward the jaw and sometimes the jaw pain is by itself.  Does have a family history both brother and father.    Last Echo: None  Last Cath: None      Past Medical History:  Past Medical History:   Diagnosis Date   • Arrhythmia    • History of stomach ulcers    • Hypertension        PAST SURGERY   Past Surgical History:   Procedure Laterality Date   • CHOLECYSTECTOMY WITH INTRAOPERATIVE CHOLANGIOGRAM N/A 2018    Procedure: LAPAROSCOPIC POSSIBLE OPEN CHOLECYSTECTOMY WITH INTRAOPERATIVE CHOLANGIOGRAM         (C-ARM#1);  Surgeon: Tyron Gonzalez MD;  Location: [facility];  Service: General   • COLONOSCOPY     • CYST REMOVAL      ganglion right wrist   • ENDOSCOPY  2012    30647 (1)    Normal hypopharynx, esophagus, GE junction, duodenum, symmetrical & patent pylorus. Ulcers in antrum have healed. Thereis mild erythema that is present in antrum. Multiple biopsies were performed. 2012    n/a        Home Medications:      Current Outpatient Medications:   •  Ca & Phos-Vit D-Mag (CALCIUM) 413--133 tablet, Take  by mouth., Disp: , Rfl:   •  Cholecalciferol (VITAMIN D) 125 MCG (5000 UT) capsule capsule, Take 5,000 Units by mouth  Daily., Disp: , Rfl:   •  FISH OIL-KRILL OIL PO, Take 3 capsules by mouth Daily., Disp: , Rfl:   •  fluticasone (FLONASE) 50 MCG/ACT nasal spray, 2 sprays into each nostril Daily., Disp: , Rfl:   •  Ketotifen Fumarate (ALAWAY OP), Administer 1 drop to both eyes 2 (Two) Times a Day., Disp: , Rfl:   •  Magnesium 125 MG capsule, Take  by mouth., Disp: , Rfl:   •  Multiple Vitamins-Minerals (MULTIVITAMIN MEN 50+ PO), Take 1 tablet by mouth Daily., Disp: , Rfl:   •  omeprazole (priLOSEC) 20 MG capsule, Take 20 mg by mouth Daily., Disp: , Rfl:   •  Potassium Gluconate 550 (90 K) MG tablet, Take  by mouth., Disp: , Rfl:   •  Unable to find, Daily. Med Name:calcium magnesium, Disp: , Rfl:   •  Zinc 10 MG lozenge, Dissolve 5 mg in the mouth., Disp: , Rfl:   •  HYDROcodone-acetaminophen (NORCO) 7.5-325 MG per tablet, Take 1 tablet by mouth Every 6 (Six) Hours As Needed for Moderate Pain  or Severe Pain ., Disp: 20 tablet, Rfl: 0    Allergies:  No Known Allergies     Social History:  Social History     Socioeconomic History   • Marital status:      Spouse name: Not on file   • Number of children: Not on file   • Years of education: Not on file   • Highest education level: Not on file   Tobacco Use   • Smoking status: Former Smoker     Last attempt to quit: 1988     Years since quittin.8   • Smokeless tobacco: Former User     Quit date: 1988   Substance and Sexual Activity   • Alcohol use: No   • Drug use: No   • Sexual activity: Defer        Family History:  History reviewed. No pertinent family history.      Review of Systems:   Constitution: No chills, no rigors, no unexplained weight loss or weight gain    Eyes:  No diplopia, no blurred vision, no loss of vision, conjunctiva is pink and sclera is anicteric    ENT:  No tinnitus, no otorrhea, no epistaxis, no sore throat   Respiratory: No cough, no hemoptysis    Cardiovascular:  As mentioned in HPI    Gastrointestinal: No nausea, no vomiting, no  hematemesis, no diarrhea or constipation, no melena    Genitourinary: No frequency of dysuria no hematuria    Integument: positive for  No pruritis and  no skin rash    Hematologic / Lymphatic: No excessive bleeding, easy bruising, fatigue, lymphadenopathy and petechiae    Musculoskeletal: No joint pain, joint stiffness, joint swelling, muscle pain, muscle weakness and neck pain    Neurological: No dizziness, headaches, light headedness, seizures and vertigo or stroke    Behavioral/Psych: No depression, or bipolar disorder    Endocrine: no h/o diabetes, hyperlipidemia or thyroid problems        Vitals:    02/04/20 1355   BP: 140/82   Pulse: 86   SpO2: 100%       Body mass index is 26.73 kg/m².          Physical Exam:   General Appearance:    Alert, oriented, cooperative, in no acute distress     Head:    Normocephalic, atraumatic, without obvious abnormality     Eyes:            Lids and lashes normal, conjunctivae and sclerae normal, no   icterus, no pallor     Ears:    Ears appear intact with no abnormalities noted     Throat:   Mucous membranes pink and moist     Neck:   Supple, trachea midline, no carotid bruit, no JVD     Lungs:     Air enty equal,  Clear to auscultation, respirations regular, Unlabored. No wheezes, rales, rhonchi    Heart:    Regular rhythm and normal rate, normal S1 and S2, no            murmur, no gallop,      Abdomen:     Normal bowel sounds, no masses, liver and spleen nonpalpable, soft, non-tender, non-distended, no guarding, no rebound tenderness       Extremities:   Moves all extremities well, no edema, no cyanosis, no              Redness, no clubbing     Pulses:   Pulses palpable and equal bilaterally       Neurologic:   Alert and oriented to person, place, and time. No focal neurological deficits       Lab Review:     No visits with results within 6 Month(s) from this visit.   Latest known visit with results is:   Admission on 04/06/2018, Discharged on 04/06/2018   Component Date  Value Ref Range Status   • Case Report 04/06/2018    Final                    Value:Surgical Pathology Report                         Case: [id number]                                  Authorizing Provider:  Tyron Gonzalez MD            Collected:           04/06/2018 12:53 PM          Ordering Location:     [facility]             Received:            04/06/2018 03:08 PM                                 [place]                                                              Pathologist:           Ki Bonilla MD                                                            Specimen:    Gallbladder, and contents                                                                 • Final Diagnosis 04/06/2018    Final                    Value:This result contains rich text formatting which cannot be displayed here.   • Gross Description 04/06/2018    Final                    Value:This result contains rich text formatting which cannot be displayed here.   ]    Assessment/Plan     1. Precordial pain  This pain is atypical.  It is dull but is fleeting.  Exertion does not make it better or worse.  He had no his nausea vomiting.  He does have a history of gastric reflux.  He does have significant family history.  He is male and his lipid profile is unknown though he says he thinks the last time was checked it was slightly elevated.  He does not smoke.  This time we will get a treadmill test and echo.  - Treadmill Stress Test  - Holter Monitor - 72 Hour Up To 21 Days; Future  - Adult Transthoracic Echo Complete W/ Cont if Necessary Per Protocol    2. Palpitations  He noticed these very frequently.  Nothing seems to make it better or worse.  He takes magnesium and potassium supplements with no benefit.  At this time we will do a heart monitor see if there is any significant abnormalities.  He does have a right bundle branch block incomplete on his resting ECG.  - Treadmill Stress Test  - Holter Monitor - 72 Hour Up To 21  Days; Future  - Adult Transthoracic Echo Complete W/ Cont if Necessary Per Protocol        Return in about 3 months (around 5/4/2020).

## 2020-02-05 DIAGNOSIS — R07.2 PRECORDIAL PAIN: Primary | ICD-10-CM

## 2020-02-06 ENCOUNTER — APPOINTMENT (OUTPATIENT)
Dept: LAB | Facility: HOSPITAL | Age: 60
End: 2020-02-06

## 2020-02-06 PROCEDURE — 36415 COLL VENOUS BLD VENIPUNCTURE: CPT | Performed by: INTERNAL MEDICINE

## 2020-02-06 PROCEDURE — 80061 LIPID PANEL: CPT | Performed by: INTERNAL MEDICINE

## 2020-02-06 PROCEDURE — 80053 COMPREHEN METABOLIC PANEL: CPT | Performed by: INTERNAL MEDICINE

## 2020-02-07 LAB
ALBUMIN SERPL-MCNC: 4.6 G/DL (ref 3.5–5.2)
ALBUMIN/GLOB SERPL: 2 G/DL
ALP SERPL-CCNC: 76 U/L (ref 39–117)
ALT SERPL W P-5'-P-CCNC: 16 U/L (ref 1–41)
ANION GAP SERPL CALCULATED.3IONS-SCNC: 14.9 MMOL/L (ref 5–15)
AST SERPL-CCNC: 21 U/L (ref 1–40)
BILIRUB SERPL-MCNC: 0.4 MG/DL (ref 0.2–1.2)
BUN BLD-MCNC: 12 MG/DL (ref 8–23)
BUN/CREAT SERPL: 11 (ref 7–25)
CALCIUM SPEC-SCNC: 9.2 MG/DL (ref 8.6–10.5)
CHLORIDE SERPL-SCNC: 103 MMOL/L (ref 98–107)
CHOLEST SERPL-MCNC: 167 MG/DL (ref 0–200)
CO2 SERPL-SCNC: 23.1 MMOL/L (ref 22–29)
CREAT BLD-MCNC: 1.09 MG/DL (ref 0.76–1.27)
GFR SERPL CREATININE-BSD FRML MDRD: 69 ML/MIN/1.73
GLOBULIN UR ELPH-MCNC: 2.3 GM/DL
GLUCOSE BLD-MCNC: 79 MG/DL (ref 65–99)
HDLC SERPL-MCNC: 46 MG/DL (ref 40–60)
LDLC SERPL CALC-MCNC: 104 MG/DL (ref 0–100)
LDLC/HDLC SERPL: 2.26 {RATIO}
POTASSIUM BLD-SCNC: 4 MMOL/L (ref 3.5–5.2)
PROT SERPL-MCNC: 6.9 G/DL (ref 6–8.5)
SODIUM BLD-SCNC: 141 MMOL/L (ref 136–145)
TRIGL SERPL-MCNC: 86 MG/DL (ref 0–150)
VLDLC SERPL-MCNC: 17.2 MG/DL (ref 5–40)

## 2020-02-27 ENCOUNTER — OFFICE VISIT (OUTPATIENT)
Dept: CARDIOLOGY | Facility: CLINIC | Age: 60
End: 2020-02-27

## 2020-02-27 VITALS
RESPIRATION RATE: 20 BRPM | DIASTOLIC BLOOD PRESSURE: 92 MMHG | SYSTOLIC BLOOD PRESSURE: 150 MMHG | HEART RATE: 101 BPM | OXYGEN SATURATION: 98 % | BODY MASS INDEX: 26.81 KG/M2 | HEIGHT: 69 IN | WEIGHT: 181 LBS

## 2020-02-27 DIAGNOSIS — R07.2 PRECORDIAL PAIN: ICD-10-CM

## 2020-02-27 DIAGNOSIS — I10 BENIGN ESSENTIAL HTN: Primary | ICD-10-CM

## 2020-02-27 DIAGNOSIS — R00.2 PALPITATIONS: ICD-10-CM

## 2020-02-27 LAB
BH CV ECHO MEAS - ACS: 2.4 CM
BH CV ECHO MEAS - AO MAX PG (FULL): 1.6 MMHG
BH CV ECHO MEAS - AO MAX PG: 6.1 MMHG
BH CV ECHO MEAS - AO MEAN PG (FULL): 1 MMHG
BH CV ECHO MEAS - AO MEAN PG: 3 MMHG
BH CV ECHO MEAS - AO ROOT AREA (BSA CORRECTED): 1.8
BH CV ECHO MEAS - AO ROOT AREA: 9.6 CM^2
BH CV ECHO MEAS - AO ROOT DIAM: 3.5 CM
BH CV ECHO MEAS - AO V2 MAX: 123 CM/SEC
BH CV ECHO MEAS - AO V2 MEAN: 87 CM/SEC
BH CV ECHO MEAS - AO V2 VTI: 29.6 CM
BH CV ECHO MEAS - ASC AORTA: 3.1 CM
BH CV ECHO MEAS - AVA(I,A): 3.3 CM^2
BH CV ECHO MEAS - AVA(I,D): 3.3 CM^2
BH CV ECHO MEAS - AVA(V,A): 3.5 CM^2
BH CV ECHO MEAS - AVA(V,D): 3.5 CM^2
BH CV ECHO MEAS - BSA(HAYCOCK): 2 M^2
BH CV ECHO MEAS - BSA: 2 M^2
BH CV ECHO MEAS - BZI_BMI: 26.7 KILOGRAMS/M^2
BH CV ECHO MEAS - BZI_METRIC_HEIGHT: 175.3 CM
BH CV ECHO MEAS - BZI_METRIC_WEIGHT: 82.1 KG
BH CV ECHO MEAS - EDV(CUBED): 79 ML
BH CV ECHO MEAS - EDV(MOD-SP2): 52 ML
BH CV ECHO MEAS - EDV(MOD-SP4): 72.3 ML
BH CV ECHO MEAS - EDV(TEICH): 82.6 ML
BH CV ECHO MEAS - EF(CUBED): 59.7 %
BH CV ECHO MEAS - EF(MOD-SP2): 52.3 %
BH CV ECHO MEAS - EF(MOD-SP4): 61.4 %
BH CV ECHO MEAS - EF(TEICH): 51.5 %
BH CV ECHO MEAS - EPSS: 0.7 CM
BH CV ECHO MEAS - ESV(CUBED): 31.9 ML
BH CV ECHO MEAS - ESV(MOD-SP2): 24.8 ML
BH CV ECHO MEAS - ESV(MOD-SP4): 27.9 ML
BH CV ECHO MEAS - ESV(TEICH): 40 ML
BH CV ECHO MEAS - FS: 26.1 %
BH CV ECHO MEAS - IVS/LVPW: 0.74
BH CV ECHO MEAS - IVSD: 0.77 CM
BH CV ECHO MEAS - LA DIMENSION: 2.9 CM
BH CV ECHO MEAS - LA/AO: 0.83
BH CV ECHO MEAS - LV DIASTOLIC VOL/BSA (35-75): 36.5 ML/M^2
BH CV ECHO MEAS - LV MASS(C)D: 124.1 GRAMS
BH CV ECHO MEAS - LV MASS(C)DI: 62.7 GRAMS/M^2
BH CV ECHO MEAS - LV MAX PG: 4.4 MMHG
BH CV ECHO MEAS - LV MEAN PG: 2 MMHG
BH CV ECHO MEAS - LV SYSTOLIC VOL/BSA (12-30): 14.1 ML/M^2
BH CV ECHO MEAS - LV V1 MAX: 105 CM/SEC
BH CV ECHO MEAS - LV V1 MEAN: 66.4 CM/SEC
BH CV ECHO MEAS - LV V1 VTI: 23.8 CM
BH CV ECHO MEAS - LVIDD: 4.3 CM
BH CV ECHO MEAS - LVIDS: 3.2 CM
BH CV ECHO MEAS - LVLD AP2: 7.2 CM
BH CV ECHO MEAS - LVLD AP4: 7.9 CM
BH CV ECHO MEAS - LVLS AP2: 7.2 CM
BH CV ECHO MEAS - LVLS AP4: 6.5 CM
BH CV ECHO MEAS - LVOT AREA (M): 4.2 CM^2
BH CV ECHO MEAS - LVOT AREA: 4.2 CM^2
BH CV ECHO MEAS - LVOT DIAM: 2.3 CM
BH CV ECHO MEAS - LVPWD: 1 CM
BH CV ECHO MEAS - MR MAX PG: 30.5 MMHG
BH CV ECHO MEAS - MR MAX VEL: 276 CM/SEC
BH CV ECHO MEAS - MV A MAX VEL: 56.6 CM/SEC
BH CV ECHO MEAS - MV DEC SLOPE: 291 CM/SEC^2
BH CV ECHO MEAS - MV E MAX VEL: 61.7 CM/SEC
BH CV ECHO MEAS - MV E/A: 1.1
BH CV ECHO MEAS - MV MAX PG: 2.5 MMHG
BH CV ECHO MEAS - MV MEAN PG: 1 MMHG
BH CV ECHO MEAS - MV P1/2T MAX VEL: 71.7 CM/SEC
BH CV ECHO MEAS - MV P1/2T: 72.2 MSEC
BH CV ECHO MEAS - MV V2 MAX: 79.3 CM/SEC
BH CV ECHO MEAS - MV V2 MEAN: 47.6 CM/SEC
BH CV ECHO MEAS - MV V2 VTI: 18.2 CM
BH CV ECHO MEAS - MVA P1/2T LCG: 3.1 CM^2
BH CV ECHO MEAS - MVA(P1/2T): 3 CM^2
BH CV ECHO MEAS - MVA(VTI): 5.4 CM^2
BH CV ECHO MEAS - PA MAX PG: 4.2 MMHG
BH CV ECHO MEAS - PA MEAN PG: 2 MMHG
BH CV ECHO MEAS - PA V2 MAX: 102 CM/SEC
BH CV ECHO MEAS - PA V2 MEAN: 72.1 CM/SEC
BH CV ECHO MEAS - PA V2 VTI: 23.6 CM
BH CV ECHO MEAS - RAP SYSTOLE: 3 MMHG
BH CV ECHO MEAS - RVSP: 23.3 MMHG
BH CV ECHO MEAS - SI(AO): 143.8 ML/M^2
BH CV ECHO MEAS - SI(CUBED): 23.8 ML/M^2
BH CV ECHO MEAS - SI(LVOT): 49.9 ML/M^2
BH CV ECHO MEAS - SI(MOD-SP2): 13.7 ML/M^2
BH CV ECHO MEAS - SI(MOD-SP4): 22.4 ML/M^2
BH CV ECHO MEAS - SI(TEICH): 21.5 ML/M^2
BH CV ECHO MEAS - SV(AO): 284.8 ML
BH CV ECHO MEAS - SV(CUBED): 47.1 ML
BH CV ECHO MEAS - SV(LVOT): 98.9 ML
BH CV ECHO MEAS - SV(MOD-SP2): 27.2 ML
BH CV ECHO MEAS - SV(MOD-SP4): 44.4 ML
BH CV ECHO MEAS - SV(TEICH): 42.6 ML
BH CV ECHO MEAS - TR MAX VEL: 225 CM/SEC
BH CV VAS BP LEFT ARM: NORMAL MMHG

## 2020-02-27 PROCEDURE — 99214 OFFICE O/P EST MOD 30 MIN: CPT | Performed by: NURSE PRACTITIONER

## 2020-02-27 RX ORDER — LISINOPRIL 10 MG/1
10 TABLET ORAL DAILY
Qty: 30 TABLET | Refills: 3 | Status: SHIPPED | OUTPATIENT
Start: 2020-02-27 | End: 2020-02-27 | Stop reason: ALTCHOICE

## 2020-02-27 RX ORDER — METOPROLOL SUCCINATE 25 MG/1
25 TABLET, EXTENDED RELEASE ORAL DAILY
Qty: 30 TABLET | Refills: 5 | Status: SHIPPED | OUTPATIENT
Start: 2020-02-27 | End: 2020-05-05 | Stop reason: SDUPTHER

## 2020-02-27 NOTE — PROGRESS NOTES
"chief complaint  elevated blood pressure\"      History of Present Illness     \"Manuel Guzman is a 59 y.o. male who is been noticing his heart skipping beats.  He said sometimes he will race.  It can her occur at bedtime which can occur during the day.  Nothing really seems to make it better or worse.  It just seems to happen.  Associated with the palpitations is some shortness of breath.  He denies any syncope or presyncope.  He also notes that he has some chest pain.  He says this is pressure-like usually fleeting.  Can sometimes goes toward the jaw and sometimes the jaw pain is by itself.  Does have a family history both brother and father.     Last Echo: None  Last Cath: None\" from Dr. Jaimes note.     2/27/2020: Patient presented today for TST and echocardiogram. During TST, nurse and MA came to get me because they were concerned with elevated blood pressure of  219/67mmHG during stage II of test and resting blood pressure of 146/101.  When I presented into the exam room patient denied chest pain, pressure. Did complain of shortness of breath and palpitations as described above. He is very concerned about his elevated blood pressure now. Blood pressure taken after resting and found to be 150/92 Left arm sitting. Patient recently completed a Holter and results below:    Patient diary submitted.Palpitations was reported during the monitoring period. Palpitations correlated with episodes of premature ventricular contractions. Patient reported occasional episodes of palpitations. The diary states 23 episodes occurred. The predominant rhythm noted during the testing period was sinus rhythm. Premature atrial contractions occured rarely. There were four episodes of supraventricular tachycardia. The peak heart rate was 149 beats per minute. Premature ventricular contractions occured frequently. Ventricular couplets. There were no episodes of ventricular tachycardia.   Study Impressions An abnormal monitor study. " Symptomatic PVCs.     Previous diagnostic testing for coronary artery disease: None.  Previous history of cardiac disease includes none. Patient denies history of angina, arrhythmia, CABG, cardiomyopathy, CHF, coronary angioplasty, coronary artery disease, coronary artery stent, ischemic heart disease, pericarditis, previous M.I. and valvular disease.      Cardiac Risk Factors:  hypertension  Comments Denies prior cardiac history    Past Medical History:   Diagnosis Date   • Arrhythmia    • History of stomach ulcers    • Hypertension      Past Surgical History:   Procedure Laterality Date   • CHOLECYSTECTOMY WITH INTRAOPERATIVE CHOLANGIOGRAM N/A 2018    Procedure: LAPAROSCOPIC POSSIBLE OPEN CHOLECYSTECTOMY WITH INTRAOPERATIVE CHOLANGIOGRAM         (C-ARM#1);  Surgeon: Tyron Gonzalez MD;  Location: [facility];  Service: General   • COLONOSCOPY     • CYST REMOVAL      ganglion right wrist   • ENDOSCOPY  2012    16512 (1)    Normal hypopharynx, esophagus, GE junction, duodenum, symmetrical & patent pylorus. Ulcers in antrum have healed. Thereis mild erythema that is present in antrum. Multiple biopsies were performed. 2012    n/a      Social History     Socioeconomic History   • Marital status:      Spouse name: Not on file   • Number of children: Not on file   • Years of education: Not on file   • Highest education level: Not on file   Tobacco Use   • Smoking status: Former Smoker     Last attempt to quit: 1988     Years since quittin.9   • Smokeless tobacco: Former User     Quit date: 1988   Substance and Sexual Activity   • Alcohol use: No   • Drug use: No   • Sexual activity: Defer     No family history on file.    ALLERGIES:  No Known Allergies      Review of Systems   Constitution: Negative for chills, fever and weight gain.   HENT: Negative for congestion and nosebleeds.    Eyes: Negative for blurred vision and double vision.   Cardiovascular: Positive for chest pain, dyspnea on  exertion and palpitations. Negative for leg swelling.        See above in HPI   Respiratory: Negative for cough and snoring.    Endocrine: Negative for polydipsia, polyphagia and polyuria.   Hematologic/Lymphatic: Negative for bleeding problem. Does not bruise/bleed easily.   Musculoskeletal: Negative for falls and muscle weakness.   Gastrointestinal: Negative for bloating, heartburn, nausea and vomiting.   Genitourinary: Negative for dysuria and hematuria.   Neurological: Negative for dizziness, headaches, light-headedness and seizures.   Psychiatric/Behavioral: Negative for depression. The patient does not have insomnia and is not nervous/anxious.    Allergic/Immunologic: Negative for environmental allergies and persistent infections.       Current Outpatient Medications   Medication Sig Dispense Refill   • Ca & Phos-Vit D-Mag (CALCIUM) 288--133 tablet Take  by mouth.     • Cholecalciferol (VITAMIN D) 125 MCG (5000 UT) capsule capsule Take 5,000 Units by mouth Daily.     • FISH OIL-KRILL OIL PO Take 3 capsules by mouth Daily.     • fluticasone (FLONASE) 50 MCG/ACT nasal spray 2 sprays into each nostril Daily.     • Ketotifen Fumarate (ALAWAY OP) Administer 1 drop to both eyes 2 (Two) Times a Day.     • Magnesium 125 MG capsule Take  by mouth.     • metoprolol succinate XL (TOPROL-XL) 25 MG 24 hr tablet Take 1 tablet by mouth Daily. 30 tablet 5   • Multiple Vitamins-Minerals (MULTIVITAMIN MEN 50+ PO) Take 1 tablet by mouth Daily.     • omeprazole (priLOSEC) 20 MG capsule Take 20 mg by mouth Daily.     • Potassium Gluconate 550 (90 K) MG tablet Take  by mouth.     • Unable to find Daily. Med Name:calcium magnesium     • Zinc 10 MG lozenge Dissolve 5 mg in the mouth.       No current facility-administered medications for this visit.        OBJECTIVE:    Physical Exam:   Physical Exam   Constitutional: He is oriented to person, place, and time. Vital signs are normal. He appears well-developed and  "well-nourished.  Non-toxic appearance. He does not have a sickly appearance. No distress.   HENT:   Head: Normocephalic and atraumatic.   Right Ear: External ear normal.   Left Ear: External ear normal.   Eyes: Conjunctivae and lids are normal.   Neck: Normal range of motion. Neck supple. No JVD present.   Cardiovascular: Regular rhythm, S1 normal, S2 normal, normal heart sounds, intact distal pulses and normal pulses. Tachycardia present. PMI is not displaced. Exam reveals no gallop, no S3, no S4 and no friction rub.   No murmur heard.  Pulmonary/Chest: Effort normal and breath sounds normal. No respiratory distress. He has no decreased breath sounds. He has no wheezes. He has no rales. He exhibits no tenderness.   Abdominal: Soft. Normal appearance and bowel sounds are normal. He exhibits no distension. There is no tenderness.   Musculoskeletal: He exhibits no edema.   Neurological: He is alert and oriented to person, place, and time. Gait normal.   Skin: Skin is warm and dry. No rash noted. He is not diaphoretic. No erythema. No pallor.   Psychiatric: He has a normal mood and affect. His behavior is normal. Judgment and thought content normal.   Vitals reviewed.    Vitals:    02/27/20 1631   BP: 150/92   BP Location: Left arm   Patient Position: Sitting   Pulse: 101   Resp: 20   SpO2: 98%   Weight: 82.1 kg (181 lb)   Height: 175.3 cm (69.02\")       DATA REVIEWED:        No radiology results for the last 30 days.  CXR:     CT:     Labs: BMP, CBC, LIPID, TSH  Lab Results   Component Value Date    GLUCOSE 79 02/06/2020    CALCIUM 9.2 02/06/2020     02/06/2020    K 4.0 02/06/2020    CO2 23.1 02/06/2020     02/06/2020    BUN 12 02/06/2020    CREATININE 1.09 02/06/2020    EGFRIFNONA 69 02/06/2020    BCR 11.0 02/06/2020    ANIONGAP 14.9 02/06/2020     No results found for: WBC, HGB, HCT, MCV, PLT  Lab Results   Component Value Date    CHOL 167 02/06/2020     Lab Results   Component Value Date    TRIG 86 " 02/06/2020     Lab Results   Component Value Date    HDL 46 02/06/2020     No components found for: LDLCALC  Lab Results   Component Value Date     (H) 02/06/2020     No results found for: HDLLDLRATIO  No components found for: CHOLHDL  No results found for: TSH  No results found for: PROBNP  EKG:               TTE:     Stress tests:     LHC:    Holter Monitor Procedure:     Study Description Monitor placed on patient by KG POWDERLY on 2/4/2020 at 14:46 CST. The monitor was scanned on 2/24/2020. The patient was monitored for 14 days. Indications for this exam include PRECORDIAL PAIN. Total beats: [id number]. Average HR: 77. Min HR: 45. Max HR: 163.   Study Findings Patient diary submitted.Palpitations was reported during the monitoring period. Palpitations correlated with episodes of premature ventricular contractions. Patient reported occasional episodes of palpitations. The diary states 23 episodes occurred. The predominant rhythm noted during the testing period was sinus rhythm. Premature atrial contractions occured rarely. There were four episodes of supraventricular tachycardia. The peak heart rate was 149 beats per minute. Premature ventricular contractions occured frequently. Ventricular couplets. There were no episodes of ventricular tachycardia.   Study Impressions An abnormal monitor study. Symptomatic PVCs.   Result Notes for Holter Monitor - 72 Hour Up To 21 Days     Notes recorded by Niecy Caraballo MA on 2/27/2020 at 4:07 PM CST  He had both his stress test and echo on 02/27/2020. He is going to start the Toprol xl.           The following portions of the patient's history were reviewed and updated as appropriate: allergies, current medications, past family history, past medical history, past social history, past surgical history and problem list.  Old records reviewed and pertinent information is included in the above objective data.     ASSESSMENT/PLAN:       Diagnosis Plan   1. Benign  essential HTN  metoprolol succinate XL (TOPROL-XL) 25 MG 24 hr tablet    HTN, essential.   BP noted to be moderately elevated today in office and at other visits. , S1, S2 normal, no gallop, no murmur, chest clear, no JVD, no HSM, no edema.    LVH: exam deferred.  LVEF:Not Available.   Diastolic function:Not Available.  End-organ damage: No evidence    DASH; medication compliance    Start Toprol XL 25mg daily       2. Palpitations  Holter revealed symptomatic PVC's and there were four episodes of supraventricular tachycardia. The peak heart rate was 149 beats per minute.   Discussed the benign nature of the majority of causes of palpitations.   Discussed lifestyle modifications including reducing caffeine intake, reducing stress, and increasing exercise tolerance.  Reassurance given to patient.    Discussion with patient regarding cutting back on caffeine intake, as he drinks 2 pots of coffee daily. He stated he would start drinking decaf.    TTE to evaluate for structural heart disease- results pending per Dr. Jarrett     3. Precordial pain  Chest pain syndrome. Pain characteristic: atypical angina    Ischemic evaluation and TTE performed today- results pending per Dr. Jarrett           Follow up: as indicated with Dr. Jarrett.               This document has been electronically signed by HORTENSIA Petit on February 27, 2020 4:32 PM

## 2020-02-28 ENCOUNTER — TELEPHONE (OUTPATIENT)
Dept: CARDIOLOGY | Facility: CLINIC | Age: 60
End: 2020-02-28

## 2020-02-28 LAB
BH CV STRESS BP STAGE 1: NORMAL
BH CV STRESS BP STAGE 2: NORMAL
BH CV STRESS DURATION MIN STAGE 1: 3
BH CV STRESS DURATION MIN STAGE 2: 2
BH CV STRESS DURATION SEC STAGE 1: 0
BH CV STRESS DURATION SEC STAGE 2: 38
BH CV STRESS GRADE STAGE 1: 10
BH CV STRESS GRADE STAGE 2: 12
BH CV STRESS HR STAGE 1: 144
BH CV STRESS HR STAGE 2: 164
BH CV STRESS METS STAGE 1: 5
BH CV STRESS METS STAGE 2: 7.5
BH CV STRESS PROTOCOL 1: NORMAL
BH CV STRESS RECOVERY BP: NORMAL MMHG
BH CV STRESS RECOVERY HR: 111 BPM
BH CV STRESS SPEED STAGE 1: 1.7
BH CV STRESS SPEED STAGE 2: 2.5
BH CV STRESS STAGE 1: 1
BH CV STRESS STAGE 2: 2
MAXIMAL PREDICTED HEART RATE: 160 BPM
PERCENT MAX PREDICTED HR: 102.5 %
STRESS BASELINE BP: NORMAL MMHG
STRESS BASELINE HR: 82 BPM
STRESS PERCENT HR: 121 %
STRESS POST EXERCISE DUR MIN: 5 MIN
STRESS POST EXERCISE DUR SEC: 37 SEC
STRESS POST PEAK BP: NORMAL MMHG
STRESS POST PEAK HR: 164 BPM
STRESS TARGET HR: 136 BPM

## 2020-02-28 NOTE — TELEPHONE ENCOUNTER
I called and spoke to the patient and told him that once the Toprol gets in his system that he should start to feel better. I told him per Dr Jarrett that his stress test was normal. I also told him to come in on March 12th for blood pressure check and EKG to make sure the Toprol is working for him.

## 2020-02-28 NOTE — TELEPHONE ENCOUNTER
----- Message from Tiffanie Padilla RN sent at 2/28/2020 12:00 PM CST -----  Contact: [phone]  I tried to call Dr Jarrett about this patient. He started him on blood pressure meds yesterday.    I don't care to call patient back. Just wanted to ask Dr Jarrett if he wanted to see patient next week.     Thank you     ----- Message -----  From: Tammi Wang RN  Sent: 2/28/2020   8:52 AM CST  To: Tiffanie Padilla RN    Can you take care of this one. You did his stress yesterday I believe. It doesn't look like Dr Jarrett dictated on it yet   ----- Message -----  From: Niecy Ramirez  Sent: 2/28/2020   8:17 AM CST  To: Tammi Wang RN    Pt has several questions and concerns regarding his blood pressure and being light headed since having stress test. He is requesting a call back at [phone].

## 2020-03-12 ENCOUNTER — CLINICAL SUPPORT (OUTPATIENT)
Dept: CARDIOLOGY | Facility: CLINIC | Age: 60
End: 2020-03-12

## 2020-03-12 NOTE — PROGRESS NOTES
Pt here for BP check per Dr Jarrett. /86. Pt left and we are to call him if Dr has any recommendations.

## 2020-05-05 ENCOUNTER — OFFICE VISIT (OUTPATIENT)
Dept: CARDIOLOGY | Facility: CLINIC | Age: 60
End: 2020-05-05

## 2020-05-05 VITALS — HEIGHT: 69 IN | WEIGHT: 181 LBS | BODY MASS INDEX: 26.81 KG/M2

## 2020-05-05 DIAGNOSIS — R07.2 PRECORDIAL PAIN: ICD-10-CM

## 2020-05-05 DIAGNOSIS — R00.2 PALPITATIONS: Primary | ICD-10-CM

## 2020-05-05 DIAGNOSIS — I10 BENIGN ESSENTIAL HTN: ICD-10-CM

## 2020-05-05 PROCEDURE — 99214 OFFICE O/P EST MOD 30 MIN: CPT | Performed by: INTERNAL MEDICINE

## 2020-05-05 RX ORDER — METOPROLOL SUCCINATE 25 MG/1
25 TABLET, EXTENDED RELEASE ORAL DAILY
Qty: 90 TABLET | Refills: 4 | Status: SHIPPED | OUTPATIENT
Start: 2020-05-05 | End: 2020-09-04

## 2020-05-05 RX ORDER — ASPIRIN 81 MG/1
81 TABLET ORAL DAILY
COMMUNITY

## 2020-05-05 NOTE — PROGRESS NOTES
Manuel Guzman  59 y.o. male    2020  Chief Complaint   Patient presents with   • Follow-up   Patient consents to telephone visit    History of Present Illness  Palpitations    -        SUBJECTIVE  Overall is doing well.  He says since we started him on that metoprolol XL the feelings he was having in his chest including the pain in the palpitations have went away.  He says he feels well and is not having any symptoms when he does things.  He is having less short of air also.  All in all he feels like it in the a.m.  Tolerating his medicine well and has noticed symptoms.  No Known Allergies      Past Medical History:   Diagnosis Date   • Arrhythmia    • History of stomach ulcers    • Hypertension          Past Surgical History:   Procedure Laterality Date   • CHOLECYSTECTOMY WITH INTRAOPERATIVE CHOLANGIOGRAM N/A 2018    Procedure: LAPAROSCOPIC POSSIBLE OPEN CHOLECYSTECTOMY WITH INTRAOPERATIVE CHOLANGIOGRAM         (C-ARM#1);  Surgeon: Tyron Gonzalez MD;  Location: [facility];  Service: General   • COLONOSCOPY     • CYST REMOVAL      ganglion right wrist   • ENDOSCOPY  2012    10974 (1)    Normal hypopharynx, esophagus, GE junction, duodenum, symmetrical & patent pylorus. Ulcers in antrum have healed. Thereis mild erythema that is present in antrum. Multiple biopsies were performed. 2012    n/a          History reviewed. No pertinent family history.      Social History     Socioeconomic History   • Marital status:      Spouse name: Not on file   • Number of children: Not on file   • Years of education: Not on file   • Highest education level: Not on file   Tobacco Use   • Smoking status: Former Smoker     Last attempt to quit: 1988     Years since quittin.1   • Smokeless tobacco: Former User     Quit date: 1988   Substance and Sexual Activity   • Alcohol use: No   • Drug use: No   • Sexual activity: Defer         Prior to Admission medications    Medication Sig Start Date End  "Date Taking? Authorizing Provider   aspirin 81 MG EC tablet Take 81 mg by mouth Daily.   Yes Rei Dominguez MD   Ca & Phos-Vit D-Mag (CALCIUM) 667--133 tablet Take  by mouth.   Yes Rei Dominguez MD   Cholecalciferol (VITAMIN D) 125 MCG (5000 UT) capsule capsule Take 5,000 Units by mouth Daily.   Yes Rei Dominguez MD   FISH OIL-KRILL OIL PO Take 3 capsules by mouth Daily.   Yes Rei Dominguez MD   fluticasone (FLONASE) 50 MCG/ACT nasal spray 2 sprays into each nostril Daily.   Yes Rei Dominguez MD   Ketotifen Fumarate (ALAWAY OP) Administer 1 drop to both eyes 2 (Two) Times a Day.   Yes Rei Dominguez MD   Magnesium 125 MG capsule Take  by mouth.   Yes Rei Dominguez MD   metoprolol succinate XL (TOPROL-XL) 25 MG 24 hr tablet Take 1 tablet by mouth Daily. 5/5/20  Yes Chicho Jarrett MD   Multiple Vitamins-Minerals (MULTIVITAMIN MEN 50+ PO) Take 1 tablet by mouth Daily.   Yes Rei Dominguez MD   omeprazole (priLOSEC) 20 MG capsule Take 20 mg by mouth Daily.   Yes Rei Dominguez MD   Potassium Gluconate 550 (90 K) MG tablet Take  by mouth.   Yes Rei Dominguez MD   Unable to find Daily. Med Name:calcium magnesium   Yes Rei Dominguez MD   Zinc 10 MG lozenge Dissolve 5 mg in the mouth.   Yes Rei Dominguez MD   metoprolol succinate XL (TOPROL-XL) 25 MG 24 hr tablet Take 1 tablet by mouth Daily. 2/27/20 5/5/20 Yes Alina Ashraf APRN         OBJECTIVE    Ht 175.3 cm (69.02\")   Wt 82.1 kg (181 lb)   BMI 26.72 kg/m²         Review of Systems     Constitutional:  Denies recent weight loss, weight gain, fever or chills, no change in exercise tolerance     HENT:  Denies any hearing loss, epistaxis, hoarseness, or difficulty speaking.     Eyes: Wears eyeglasses or contact lenses     Respiratory:  Denies dyspnea with exertion,no cough, wheezing, or hemoptysis.     Cardiovascular: Negative for palpations, chest pain, " orthopnea, PND, peripheral edema, syncope, or claudication.     Gastrointestinal:  Denies change in bowel habits, dyspepsia, ulcer disease, hematochezia, or melena.     Endocrine: Negative for cold intolerance, heat intolerance, polydipsia, polyphagia and polyuria. Denies any history of weight change, heat/cold intolerance, polydipsia, polyuria     Genitourinary: Negative.      Musculoskeletal: Denies any history of arthritic symptoms or back problems     Skin:  Denies any change in hair or nails, rashes, or skin lesions.     Allergic/Immunologic: Negative.  Negative for environmental allergies, food allergies and immunocompromised state.     Neurological:  Denies any history of recurrent headaches, strokes, TIA, or seizure disorder.     Hematological: Denies any food allergies, seasonal allergies, bleeding disorders, or lymphadenopathy.     Psychiatric/Behavioral: Denies any history of depression, substance abuse, or change in cognitive function.         Physical Exam     Constitutional: Cooperative, alert and oriented, well-developed, well-nourished, in no acute distress.     HENT:   Cardiovascular:    Pulmonary/Chest:            Pulmonary: Normal breath sounds detected through the telephone conversation.      Abdominal:    Musculoskeletal:    Neurological: No  sensory deficits noted, affect appropriate, oriented to time, person, place.     Skin:   Psychiatric: He has a normal mood and affect. His behavior is normal. Judgment and thought content normal.         Procedures      No results found for: WBC, HGB, HCT, MCV, PLT  Lab Results   Component Value Date    GLUCOSE 79 02/06/2020    BUN 12 02/06/2020    CREATININE 1.09 02/06/2020    EGFRIFNONA 69 02/06/2020    BCR 11.0 02/06/2020    CO2 23.1 02/06/2020    CALCIUM 9.2 02/06/2020    ALBUMIN 4.60 02/06/2020    AST 21 02/06/2020    ALT 16 02/06/2020     Lab Results   Component Value Date    CHOL 167 02/06/2020     Lab Results   Component Value Date    TRIG 86  02/06/2020     Lab Results   Component Value Date    HDL 46 02/06/2020     No components found for: LDLCALC  Lab Results   Component Value Date     (H) 02/06/2020     No results found for: HDLLDLRATIO  No components found for: CHOLHDL  No results found for: HGBA1C  No results found for: TSH, Q8PGNXN, D9EQIDO, THYROIDAB        ASSESSMENT AND PLAN      Manuel was seen today for follow-up.    Diagnoses and all orders for this visit:    Palpitations  Essentially went away since he started metoprolol XL.  He said he feels much better and is able to do.  Benign essential HTN  When he takes his blood pressure at home he said is slightly higher in the morning and 134 but throughout the day he is in the 120s.  We will continue with the current medicine.  -     metoprolol succinate XL (TOPROL-XL) 25 MG 24 hr tablet; Take 1 tablet by mouth Daily.    Precordial pain  Pain is going away since he started metoprolol which is essentially secondary to the palpitations.  His stress test was low probability except for PVCs and the metoprolol XL care about this.      Patient's Body mass index is 26.72 kg/m². BMI is within normal parameters. No follow-up required.   Telephone visit time is 11 to 20 minutes..  Refilled his metoprolol XL.              Chicho Jarrett MD  5/5/2020  11:01

## 2020-09-03 ENCOUNTER — TELEPHONE (OUTPATIENT)
Dept: CARDIOLOGY | Facility: CLINIC | Age: 60
End: 2020-09-03

## 2020-09-03 DIAGNOSIS — I10 BENIGN ESSENTIAL HTN: Primary | ICD-10-CM

## 2020-09-03 NOTE — TELEPHONE ENCOUNTER
He called because he was wondering if maybe his Metoprolol is not lasting long enough or maybe not strong enough. He says that he feels like it doesn't last him the full day because he can feel his heart beating faster and he has palpitations. He is also having some ankle swelling.

## 2020-09-04 RX ORDER — METOPROLOL SUCCINATE 50 MG/1
50 TABLET, EXTENDED RELEASE ORAL DAILY
Qty: 30 TABLET | Refills: 11 | Status: SHIPPED | OUTPATIENT
Start: 2020-09-04 | End: 2021-11-17 | Stop reason: SDUPTHER

## 2020-09-04 NOTE — TELEPHONE ENCOUNTER
I called the patient to let him know that Dr. Jarrett wants him to increase to Metoprolol 50 mg instead of 25 mg. He is going to take 2 tablets until he runs out and then he will pick this up.

## 2021-03-11 PROCEDURE — 87635 SARS-COV-2 COVID-19 AMP PRB: CPT | Performed by: NURSE PRACTITIONER

## 2021-05-14 ENCOUNTER — OFFICE VISIT (OUTPATIENT)
Dept: CARDIOLOGY | Facility: CLINIC | Age: 61
End: 2021-05-14

## 2021-05-14 VITALS
HEIGHT: 69 IN | HEART RATE: 54 BPM | BODY MASS INDEX: 28.14 KG/M2 | DIASTOLIC BLOOD PRESSURE: 82 MMHG | SYSTOLIC BLOOD PRESSURE: 138 MMHG | OXYGEN SATURATION: 98 % | TEMPERATURE: 98.5 F | WEIGHT: 190 LBS

## 2021-05-14 DIAGNOSIS — R00.2 PALPITATIONS: Primary | ICD-10-CM

## 2021-05-14 PROCEDURE — 99213 OFFICE O/P EST LOW 20 MIN: CPT | Performed by: INTERNAL MEDICINE

## 2021-05-14 PROCEDURE — 93000 ELECTROCARDIOGRAM COMPLETE: CPT | Performed by: INTERNAL MEDICINE

## 2021-05-14 NOTE — PROGRESS NOTES
Manuel Guzman  60 y.o. male    2021  Chief Complaint   Patient presents with   • Palpitations   • Chest Pain       History of Present Illness    Palpitations  -        SUBJECTIVE  The patient overall is doing well.  He had palpitations and was placed on beta-blockers and an increase in this pretty much took it away.  He did have COVID-19 infection in March he said when he had that he noticed more palpitations but once he was over that he was back to his baseline.  He says he thinks that Toprol is taking care of this.  He denies any chest pain.  He denies any shortness of air.    He is interested in getting the COVID-19 vaccine.   No Known Allergies      Past Medical History:   Diagnosis Date   • Arrhythmia    • History of stomach ulcers    • Hypertension          Past Surgical History:   Procedure Laterality Date   • CHOLECYSTECTOMY WITH INTRAOPERATIVE CHOLANGIOGRAM N/A 2018    Procedure: LAPAROSCOPIC POSSIBLE OPEN CHOLECYSTECTOMY WITH INTRAOPERATIVE CHOLANGIOGRAM         (C-ARM#1);  Surgeon: Tyron Gonzalez MD;  Location: [facility];  Service: General   • COLONOSCOPY     • CYST REMOVAL      ganglion right wrist   • ENDOSCOPY  2012    75867 (1)    Normal hypopharynx, esophagus, GE junction, duodenum, symmetrical & patent pylorus. Ulcers in antrum have healed. Thereis mild erythema that is present in antrum. Multiple biopsies were performed. 2012    n/a          History reviewed. No pertinent family history.      Social History     Socioeconomic History   • Marital status:      Spouse name: Not on file   • Number of children: Not on file   • Years of education: Not on file   • Highest education level: Not on file   Tobacco Use   • Smoking status: Former Smoker     Quit date: 1988     Years since quittin.1   • Smokeless tobacco: Former User     Quit date: 1988   Vaping Use   • Vaping Use: Never used   Substance and Sexual Activity   • Alcohol use: No   • Drug use: No   • Sexual  "activity: Defer         Prior to Admission medications    Medication Sig Start Date End Date Taking? Authorizing Provider   aspirin 81 MG EC tablet Take 81 mg by mouth Daily.   Yes Rei Dominguez MD   Ca & Phos-Vit D-Mag (CALCIUM) 615--133 tablet Take  by mouth.   Yes Rei Dominguez MD   Cholecalciferol (VITAMIN D) 125 MCG (5000 UT) capsule capsule Take 5,000 Units by mouth Daily.   Yes Rei Dominguez MD   FISH OIL-KRILL OIL PO Take 3 capsules by mouth Daily.   Yes Rei Dominguez MD   fluticasone (FLONASE) 50 MCG/ACT nasal spray 2 sprays into each nostril Daily.   Yes Rei Dominguez MD   Ketotifen Fumarate (ALAWAY OP) Administer 1 drop to both eyes 2 (Two) Times a Day.   Yes Rei Dominguez MD   Magnesium 125 MG capsule Take  by mouth.   Yes Rei Dominguez MD   metoprolol succinate XL (TOPROL-XL) 50 MG 24 hr tablet Take 1 tablet by mouth Daily.  Patient taking differently: Take 250 mg by mouth Daily. 9/4/20  Yes Chicho Jarrett MD   Multiple Vitamins-Minerals (MULTIVITAMIN MEN 50+ PO) Take 1 tablet by mouth Daily.   Yes Rei Dominguez MD   omeprazole (priLOSEC) 20 MG capsule Take 20 mg by mouth Daily.   Yes Rei Dominguez MD   Potassium Gluconate 550 (90 K) MG tablet Take  by mouth.   Yes Rei Dominguez MD   Unable to find Daily. Med Name:calcium magnesium   Yes Rei Dominguez MD   Zinc 10 MG lozenge Dissolve 5 mg in the mouth.   Yes Rei Dominguez MD         OBJECTIVE    /82   Pulse 54   Temp 98.5 °F (36.9 °C)   Ht 175.3 cm (69\")   Wt 86.2 kg (190 lb)   SpO2 98%   BMI 28.06 kg/m²         Review of Systems     Constitutional:  Denies recent weight loss, weight gain, fever or chills, no change in exercise tolerance     HENT:  Denies any hearing loss, epistaxis, hoarseness, or difficulty speaking.     Eyes: Wears eyeglasses or contact lenses     Respiratory:  Denies dyspnea with exertion,no cough, wheezing, or " hemoptysis.     Cardiovascular: Negative for palpations, chest pain, orthopnea, PND, peripheral edema, syncope, or claudication.     Gastrointestinal:  Denies change in bowel habits, dyspepsia, ulcer disease, hematochezia, or melena.     Endocrine: Negative for cold intolerance, heat intolerance, polydipsia, polyphagia and polyuria. Denies any history of weight change, heat/cold intolerance, polydipsia, polyuria     Genitourinary: Negative.      Musculoskeletal: Denies any history of arthritic symptoms or back problems     Skin:  Denies any change in hair or nails, rashes, or skin lesions.     Allergic/Immunologic: Negative.  Negative for environmental allergies, food allergies and immunocompromised state.     Neurological:  Denies any history of recurrent headaches, strokes, TIA, or seizure disorder.     Hematological: Denies any food allergies, seasonal allergies, bleeding disorders, or lymphadenopathy.     Psychiatric/Behavioral: Denies any history of depression, substance abuse, or change in cognitive function.         Physical Exam     Constitutional: Cooperative, alert and oriented, well-developed, well-nourished, in no acute distress.     HENT:   Head: Normocephalic, normal hair patterns, no masses or tenderness.  Ears, Nose, and Throat: No gross abnormalities. No pallor or cyanosis. Dentition good.   Eyes: EOMS intact, PERRL, conjunctivae and lids unremarkable. Fundoscopic exam and visual fields not performed.   Neck: No palpable masses or adenopathy, no thyromegaly, no JVD, carotid pulses are full and equal bilaterally and without  Bruits.     Cardiovascular: Regular rhythm, S1 and S2 normal, no S3 or S4. Apical impulse not displaced. No murmurs, gallops, or rubs detected.     Pulmonary/Chest: Chest: normal symmetry, no tenderness to palpation, normal respiratory excursion, no intercostal retraction, no use of accessory muscles.            Pulmonary: Normal breath sounds. No rales or ronchi.    Abdominal:  Abdomen soft, bowel sounds normoactive, no masses, no hepatosplenomegaly, non-tender, no bruits.     Musculoskeletal: No deformities, clubbing, cyanosis, erythema, or edema observed. There are no spinal abnormalities noted. Normal muscle strength and tone. Pulses full and equal in all extremities, no bruits auscultated.     Neurological: No gross motor or sensory deficits noted, affect appropriate, oriented to time, person, place.     Skin: Warm and dry to the touch, no apparent skin lesions or masses noted.     Psychiatric: He has a normal mood and affect. His behavior is normal. Judgment and thought content normal.         Procedures      No results found for: WBC, HGB, HCT, MCV, PLT  Lab Results   Component Value Date    GLUCOSE 79 02/06/2020    BUN 12 02/06/2020    CREATININE 1.09 02/06/2020    EGFRIFNONA 69 02/06/2020    BCR 11.0 02/06/2020    CO2 23.1 02/06/2020    CALCIUM 9.2 02/06/2020    ALBUMIN 4.60 02/06/2020    AST 21 02/06/2020    ALT 16 02/06/2020     Lab Results   Component Value Date    CHOL 167 02/06/2020     Lab Results   Component Value Date    TRIG 86 02/06/2020     Lab Results   Component Value Date    HDL 46 02/06/2020     No components found for: LDLCALC  Lab Results   Component Value Date     (H) 02/06/2020     No results found for: HDLLDLRATIO  No components found for: CHOLHDL  No results found for: HGBA1C  No results found for: TSH, Q1MINXP, L3WLMUL, THYROIDAB        ASSESSMENT AND PLAN      Diagnoses and all orders for this visit:    1. Palpitations (Primary)  -     ECG 12 Lead  2.  COVID-19 history of infection  Except for a flareup during the time he was infected with COVID-19 the Toprol has seemed to do the trick.  We will continue to treat him with Toprol.    He has gotten over his COVID-19 infection.  He said he did not receive any antibodies.  We discussed his desire to have a COVID-19 vaccine I told him per the CDC protocol he should be able to go ahead and proceed on with  this.        Patient's Body mass index is 28.06 kg/m². indicating that he is overweight (BMI 25-29.9). Obesity-related health conditions include the following: none. Obesity is unchanged. BMI is No therapy is needed.. We discussed portion control, increasing exercise and And he understands...                Chicho Jarrett MD  5/14/2021  11:15 CDT

## 2021-05-19 LAB
QT INTERVAL: 416 MS
QTC INTERVAL: 394 MS

## 2021-07-19 PROCEDURE — 87635 SARS-COV-2 COVID-19 AMP PRB: CPT | Performed by: NURSE PRACTITIONER

## 2021-07-23 PROCEDURE — 87635 SARS-COV-2 COVID-19 AMP PRB: CPT | Performed by: NURSE PRACTITIONER

## 2021-11-17 DIAGNOSIS — I10 BENIGN ESSENTIAL HTN: ICD-10-CM

## 2021-11-17 RX ORDER — METOPROLOL SUCCINATE 50 MG/1
50 TABLET, EXTENDED RELEASE ORAL DAILY
Qty: 30 TABLET | Refills: 11 | Status: SHIPPED | OUTPATIENT
Start: 2021-11-17 | End: 2022-11-09 | Stop reason: SDUPTHER

## 2022-01-29 PROCEDURE — U0003 INFECTIOUS AGENT DETECTION BY NUCLEIC ACID (DNA OR RNA); SEVERE ACUTE RESPIRATORY SYNDROME CORONAVIRUS 2 (SARS-COV-2) (CORONAVIRUS DISEASE [COVID-19]), AMPLIFIED PROBE TECHNIQUE, MAKING USE OF HIGH THROUGHPUT TECHNOLOGIES AS DESCRIBED BY CMS-2020-01-R: HCPCS | Performed by: NURSE PRACTITIONER

## 2022-05-17 ENCOUNTER — OFFICE VISIT (OUTPATIENT)
Dept: CARDIOLOGY | Facility: CLINIC | Age: 62
End: 2022-05-17

## 2022-05-17 VITALS
HEIGHT: 69 IN | WEIGHT: 183.4 LBS | TEMPERATURE: 97.5 F | BODY MASS INDEX: 27.16 KG/M2 | OXYGEN SATURATION: 98 % | SYSTOLIC BLOOD PRESSURE: 128 MMHG | DIASTOLIC BLOOD PRESSURE: 80 MMHG | HEART RATE: 60 BPM

## 2022-05-17 DIAGNOSIS — R00.2 PALPITATIONS: Primary | ICD-10-CM

## 2022-05-17 PROCEDURE — 93000 ELECTROCARDIOGRAM COMPLETE: CPT | Performed by: INTERNAL MEDICINE

## 2022-05-17 PROCEDURE — 99214 OFFICE O/P EST MOD 30 MIN: CPT | Performed by: INTERNAL MEDICINE

## 2022-05-17 NOTE — PROGRESS NOTES
Manuel Guzman  61 y.o. male    2022  Chief Complaint   Patient presents with   • Palpitations       History of Present Illness  Patient with a history of palpitations    -        SUBJECTIVE  Patient said he is having palpitations again.  He says usually in stressful situation either at work or when he does things like singing in the choir or when he gets stressed.  He also says he feels fatigue at the end of the day.  In the past we have done a treadmill test which was low probability for ischemia and also a 14-day MCT and he was symptomatic for PVC.  Toprol he appeared to control before well but he still noticing it again.  He is having no angina or shortness of air.  He does have the fatigue.  No Known Allergies      Past Medical History:   Diagnosis Date   • Arrhythmia    • History of stomach ulcers    • Hypertension          Past Surgical History:   Procedure Laterality Date   • CHOLECYSTECTOMY WITH INTRAOPERATIVE CHOLANGIOGRAM N/A 2018    Procedure: LAPAROSCOPIC POSSIBLE OPEN CHOLECYSTECTOMY WITH INTRAOPERATIVE CHOLANGIOGRAM         (C-ARM#1);  Surgeon: Tyron Gonzalez MD;  Location: [facility];  Service: General   • COLONOSCOPY     • CYST REMOVAL      ganglion right wrist   • ENDOSCOPY  2012    90594 (1)    Normal hypopharynx, esophagus, GE junction, duodenum, symmetrical & patent pylorus. Ulcers in antrum have healed. Thereis mild erythema that is present in antrum. Multiple biopsies were performed. 2012    n/a          History reviewed. No pertinent family history.      Social History     Socioeconomic History   • Marital status:    Tobacco Use   • Smoking status: Former Smoker     Quit date: 1988     Years since quittin.1   • Smokeless tobacco: Former User     Quit date: 1988   Vaping Use   • Vaping Use: Never used   Substance and Sexual Activity   • Alcohol use: No   • Drug use: No   • Sexual activity: Defer         Prior to Admission medications    Medication Sig  "Start Date End Date Taking? Authorizing Provider   aspirin 81 MG EC tablet Take 81 mg by mouth Daily.   Yes Rei Dominguez MD   Ca & Phos-Vit D-Mag (CALCIUM) 293--133 tablet Take  by mouth.   Yes Rei Dominguez MD   Cholecalciferol (VITAMIN D) 125 MCG (5000 UT) capsule capsule Take 5,000 Units by mouth Daily.   Yes Rei Dominguez MD   FISH OIL-KRILL OIL PO Take 3 capsules by mouth Daily.   Yes Rei Dominguez MD   fluticasone (FLONASE) 50 MCG/ACT nasal spray 2 sprays into each nostril Daily.   Yes Rei Dominguez MD   Ketotifen Fumarate (ALAWAY OP) Administer 1 drop to both eyes 2 (Two) Times a Day.   Yes Rei Dominguez MD   Magnesium 125 MG capsule Take  by mouth.   Yes Rei Dominguez MD   metoprolol succinate XL (TOPROL-XL) 50 MG 24 hr tablet Take 1 tablet by mouth Daily. 11/17/21  Yes Chicho Jarrett MD   Multiple Vitamins-Minerals (MULTIVITAMIN MEN 50+ PO) Take 1 tablet by mouth Daily.   Yes Rei Dominguez MD   omeprazole (priLOSEC) 20 MG capsule Take 1 capsule by mouth. 3/22/21 2/19/24 Yes Emergency, Nurse Epic, RN   Potassium Gluconate 550 (90 K) MG tablet Take  by mouth.   Yes Rei Dominguez MD   Zinc 10 MG lozenge Dissolve 5 mg in the mouth.   Yes Rei Dominguez MD         OBJECTIVE    /80 (BP Location: Left arm, Patient Position: Sitting, Cuff Size: Adult)   Pulse 60   Temp 97.5 °F (36.4 °C)   Ht 175.3 cm (69\")   Wt 83.2 kg (183 lb 6.4 oz)   SpO2 98%   BMI 27.08 kg/m²         Review of Systems     Constitutional:  Denies recent weight loss, weight gain, fever or chills, no change in exercise tolerance     HENT:  Denies any hearing loss, epistaxis, hoarseness, or difficulty speaking.     Eyes: Wears eyeglasses or contact lenses     Respiratory:  Denies dyspnea with exertion,no cough, wheezing, or hemoptysis.     Cardiovascular: Negative for palpations, chest pain, orthopnea, PND, peripheral edema, syncope, or " claudication.     Gastrointestinal:  Denies change in bowel habits, dyspepsia, ulcer disease, hematochezia, or melena.     Endocrine: Negative for cold intolerance, heat intolerance, polydipsia, polyphagia and polyuria. Denies any history of weight change, heat/cold intolerance, polydipsia, polyuria     Genitourinary: Negative.      Musculoskeletal: Denies any history of arthritic symptoms or back problems     Skin:  Denies any change in hair or nails, rashes, or skin lesions.     Allergic/Immunologic: Negative.  Negative for environmental allergies, food allergies and immunocompromised state.     Neurological:  Denies any history of recurrent headaches, strokes, TIA, or seizure disorder.     Hematological: Denies any food allergies, seasonal allergies, bleeding disorders, or lymphadenopathy.     Psychiatric/Behavioral: Denies any history of depression, substance abuse, or change in cognitive function.         Physical Exam     Constitutional: Cooperative, alert and oriented, well-developed, well-nourished, in no acute distress.     HENT:   Head: Normocephalic, normal hair patterns, no masses or tenderness.  Ears, Nose, and Throat: No gross abnormalities. No pallor or cyanosis. Dentition good.   Eyes: EOMS intact, PERRL, conjunctivae and lids unremarkable. Fundoscopic exam and visual fields not performed.   Neck: No palpable masses or adenopathy, no thyromegaly, no JVD, carotid pulses are full and equal bilaterally and without  Bruits.     Cardiovascular: Regular rhythm, S1 and S2 normal, no S3 or S4. Apical impulse not displaced. No murmurs, gallops, or rubs detected.     Pulmonary/Chest: Chest: normal symmetry, no tenderness to palpation, normal respiratory excursion, no intercostal retraction, no use of accessory muscles.            Pulmonary: Normal breath sounds. No rales or ronchi.    Abdominal: Abdomen soft, bowel sounds normoactive, no masses, no hepatosplenomegaly, non-tender, no bruits.      Musculoskeletal: No deformities, clubbing, cyanosis, erythema, or edema observed. There are no spinal abnormalities noted. Normal muscle strength and tone. Pulses full and equal in all extremities, no bruits auscultated.     Neurological: No gross motor or sensory deficits noted, affect appropriate, oriented to time, person, place.     Skin: Warm and dry to the touch, no apparent skin lesions or masses noted.     Psychiatric: He has a normal mood and affect. His behavior is normal. Judgment and thought content normal.         Procedures      No results found for: WBC, HGB, HCT, MCV, PLT  Lab Results   Component Value Date    GLUCOSE 79 02/06/2020    BUN 12 02/06/2020    CREATININE 1.09 02/06/2020    EGFRIFNONA 69 02/06/2020    BCR 11.0 02/06/2020    CO2 23.1 02/06/2020    CALCIUM 9.2 02/06/2020    ALBUMIN 4.60 02/06/2020    AST 21 02/06/2020    ALT 16 02/06/2020     Lab Results   Component Value Date    CHOL 167 02/06/2020     Lab Results   Component Value Date    TRIG 86 02/06/2020     Lab Results   Component Value Date    HDL 46 02/06/2020     No components found for: LDLCALC  Lab Results   Component Value Date     (H) 02/06/2020     No results found for: HDLLDLRATIO  No components found for: CHOLHDL  No results found for: HGBA1C  No results found for: TSH, V1TRXVX, G7RJVDW, THYROIDAB        ASSESSMENT AND PLAN      Diagnoses and all orders for this visit:    1. Palpitations (Primary)  -     ECG 12 Lead    Patient is having recurrence of his palpitations.  He is on medicine now not prolong his medical status.  Our plan will be to go ahead and repeat the monitor on therapy.  We also will do a stress nuclear.  Last time he had a treadmill test which was low probability but we will do this to be little more accurate.    BMI is >= 25 and < 30. (Overweight) The following options were offered after discussion: He will follow this up with primary care.                Chicho Jarrett MD  5/17/2022  11:37  CDT

## 2022-05-18 LAB
QT INTERVAL: 408 MS
QTC INTERVAL: 408 MS

## 2022-06-06 ENCOUNTER — APPOINTMENT (OUTPATIENT)
Dept: CARDIOLOGY | Facility: HOSPITAL | Age: 62
End: 2022-06-06

## 2022-06-06 ENCOUNTER — APPOINTMENT (OUTPATIENT)
Dept: NUCLEAR MEDICINE | Facility: HOSPITAL | Age: 62
End: 2022-06-06

## 2022-06-15 ENCOUNTER — TELEPHONE (OUTPATIENT)
Dept: CARDIOLOGY | Facility: CLINIC | Age: 62
End: 2022-06-15

## 2022-06-15 NOTE — TELEPHONE ENCOUNTER
Contacted PT per Dr. Jarrett about results. Results are as follows; Patient is heart monitor showed no significant abnormality.  PT voiced understanding.        ----- Message from Chicho Jarrett MD sent at 6/2/2022 10:25 AM CDT -----  Patient is heart monitor showed no significant abnormality

## 2022-07-07 ENCOUNTER — PREP FOR SURGERY (OUTPATIENT)
Dept: OTHER | Facility: HOSPITAL | Age: 62
End: 2022-07-07

## 2022-07-07 DIAGNOSIS — Z12.11 ENCOUNTER FOR SCREENING FOR MALIGNANT NEOPLASM OF COLON: Primary | ICD-10-CM

## 2022-07-07 RX ORDER — DEXTROSE AND SODIUM CHLORIDE 5; .45 G/100ML; G/100ML
30 INJECTION, SOLUTION INTRAVENOUS CONTINUOUS PRN
Status: CANCELLED | OUTPATIENT
Start: 2022-08-25

## 2022-07-08 PROBLEM — Z12.11 ENCOUNTER FOR SCREENING FOR MALIGNANT NEOPLASM OF COLON: Status: ACTIVE | Noted: 2022-07-08

## 2022-08-25 ENCOUNTER — ANESTHESIA (OUTPATIENT)
Dept: GASTROENTEROLOGY | Facility: HOSPITAL | Age: 62
End: 2022-08-25

## 2022-08-25 ENCOUNTER — ANESTHESIA EVENT (OUTPATIENT)
Dept: GASTROENTEROLOGY | Facility: HOSPITAL | Age: 62
End: 2022-08-25

## 2022-08-25 ENCOUNTER — HOSPITAL ENCOUNTER (OUTPATIENT)
Facility: HOSPITAL | Age: 62
Setting detail: HOSPITAL OUTPATIENT SURGERY
Discharge: HOME | End: 2022-08-25
Attending: INTERNAL MEDICINE | Admitting: INTERNAL MEDICINE

## 2022-08-25 VITALS
HEART RATE: 62 BPM | BODY MASS INDEX: 25.92 KG/M2 | DIASTOLIC BLOOD PRESSURE: 71 MMHG | TEMPERATURE: 96.9 F | HEIGHT: 69 IN | SYSTOLIC BLOOD PRESSURE: 102 MMHG | OXYGEN SATURATION: 97 % | WEIGHT: 175 LBS | RESPIRATION RATE: 18 BRPM

## 2022-08-25 DIAGNOSIS — Z12.11 ENCOUNTER FOR SCREENING FOR MALIGNANT NEOPLASM OF COLON: ICD-10-CM

## 2022-08-25 DIAGNOSIS — K21.9 ESOPHAGEAL REFLUX: ICD-10-CM

## 2022-08-25 PROCEDURE — 87147 CULTURE TYPE IMMUNOLOGIC: CPT | Performed by: INTERNAL MEDICINE

## 2022-08-25 PROCEDURE — 25010000002 PROPOFOL 10 MG/ML EMULSION: Performed by: NURSE ANESTHETIST, CERTIFIED REGISTERED

## 2022-08-25 PROCEDURE — 87071 CULTURE AEROBIC QUANT OTHER: CPT | Performed by: INTERNAL MEDICINE

## 2022-08-25 RX ORDER — LIDOCAINE HYDROCHLORIDE 20 MG/ML
INJECTION, SOLUTION INTRAVENOUS AS NEEDED
Status: DISCONTINUED | OUTPATIENT
Start: 2022-08-25 | End: 2022-08-25 | Stop reason: SURG

## 2022-08-25 RX ORDER — DEXTROSE AND SODIUM CHLORIDE 5; .45 G/100ML; G/100ML
30 INJECTION, SOLUTION INTRAVENOUS CONTINUOUS PRN
Status: DISCONTINUED | OUTPATIENT
Start: 2022-08-25 | End: 2022-08-25 | Stop reason: HOSPADM

## 2022-08-25 RX ORDER — PROPOFOL 10 MG/ML
VIAL (ML) INTRAVENOUS AS NEEDED
Status: DISCONTINUED | OUTPATIENT
Start: 2022-08-25 | End: 2022-08-25 | Stop reason: SURG

## 2022-08-25 RX ADMIN — PROPOFOL 100 MG: 10 INJECTION, EMULSION INTRAVENOUS at 13:26

## 2022-08-25 RX ADMIN — PROPOFOL 50 MG: 10 INJECTION, EMULSION INTRAVENOUS at 13:34

## 2022-08-25 RX ADMIN — DEXTROSE AND SODIUM CHLORIDE 30 ML/HR: 5; 450 INJECTION, SOLUTION INTRAVENOUS at 12:40

## 2022-08-25 RX ADMIN — PROPOFOL 50 MG: 10 INJECTION, EMULSION INTRAVENOUS at 13:37

## 2022-08-25 RX ADMIN — PROPOFOL 20 MG: 10 INJECTION, EMULSION INTRAVENOUS at 13:39

## 2022-08-25 RX ADMIN — PROPOFOL 50 MG: 10 INJECTION, EMULSION INTRAVENOUS at 13:31

## 2022-08-25 RX ADMIN — LIDOCAINE HYDROCHLORIDE 40 MG: 20 INJECTION, SOLUTION INTRAVENOUS at 13:26

## 2022-08-25 RX ADMIN — PROPOFOL 50 MG: 10 INJECTION, EMULSION INTRAVENOUS at 13:28

## 2022-08-25 NOTE — H&P
Luis Sapp DO,[facility]  Gastroenterology  Hepatology  Endoscopy  Board Certified in Internal Medicine and gastroenterology  [address], [address]  [place]. [address]  - [phone]   F - [phone]     GASTROENTEROLOGY HISTORY AND PHYSICAL  NOTE   LUIS SAPP DO.         SUBJECTIVE:   2022    Name: Manuel Guzman  DOD: 1960      Chief Complaint:       Subjective : Dyspepsia.  Screening for colon cancer    Patient is 61 y.o. male presents with desire for elective EGD with biopsy and culture as well as colonoscopy.      ROS/HISTORY/ CURRENT MEDICATIONS/OBJECTIVE/VS/PE:   Review of Systems:  All systems unremarkable unless specified below.  Constitutional   HENT  Eyes   Respiratory    Cardiovascular  Gastrointestinal   Endocrine  Genitourinary    Musculoskeletal   Skin  Allergic/Immunologic    Neurological    Hematological  Psychiatric/Behavioral    History:     Past Medical History:   Diagnosis Date   • Arrhythmia    • GERD (gastroesophageal reflux disease)    • History of stomach ulcers    • Hypertension      Past Surgical History:   Procedure Laterality Date   • CHOLECYSTECTOMY WITH INTRAOPERATIVE CHOLANGIOGRAM N/A 2018    Procedure: LAPAROSCOPIC POSSIBLE OPEN CHOLECYSTECTOMY WITH INTRAOPERATIVE CHOLANGIOGRAM         (C-ARM#1);  Surgeon: Tyron Gonzalez MD;  Location: [facility];  Service: General   • COLONOSCOPY     • CYST REMOVAL      ganglion right wrist   • ENDOSCOPY  2012    58188 (1)    Normal hypopharynx, esophagus, GE junction, duodenum, symmetrical & patent pylorus. Ulcers in antrum have healed. Thereis mild erythema that is present in antrum. Multiple biopsies were performed. 2012    n/a      History reviewed. No pertinent family history.  Social History     Tobacco Use   • Smoking status: Former Smoker     Quit date: 1988     Years since quittin.4   • Smokeless tobacco: Former User     Quit date: 1988   Vaping Use   • Vaping Use: Never  used   Substance Use Topics   • Alcohol use: No   • Drug use: No     Prior to Admission medications    Medication Sig Start Date End Date Taking? Authorizing Provider   Ca & Phos-Vit D-Mag (CALCIUM) 967--133 tablet Take  by mouth.   Yes Rei Dominguez MD   Cholecalciferol (VITAMIN D) 125 MCG (5000 UT) capsule capsule Take 5,000 Units by mouth Daily.   Yes Rei Dominguez MD   Magnesium 125 MG capsule Take  by mouth.   Yes Rei Dominguez MD   metoprolol succinate XL (TOPROL-XL) 50 MG 24 hr tablet Take 1 tablet by mouth Daily. 11/17/21  Yes Chicho Jarrtet MD   omeprazole (priLOSEC) 20 MG capsule Take 1 capsule by mouth. 3/22/21 2/19/24 Yes Emergency, Nurse Eduardo, RN   Potassium Gluconate 550 (90 K) MG tablet Take  by mouth.   Yes Rei Dominguez MD   Zinc 10 MG lozenge Dissolve 5 mg in the mouth.   Yes Rei Dominguez MD   aspirin 81 MG EC tablet Take 81 mg by mouth Daily.    Rei Dominguez MD   FISH OIL-KRILL OIL PO Take 3 capsules by mouth Daily.    Rei Dominguez MD   fluticasone (FLONASE) 50 MCG/ACT nasal spray 2 sprays into each nostril Daily.    Rei Dominguez MD   Ketotifen Fumarate (ALAWAY OP) Administer 1 drop to both eyes 2 (Two) Times a Day.    Rei Dominguez MD   Multiple Vitamins-Minerals (MULTIVITAMIN MEN 50+ PO) Take 1 tablet by mouth Daily.    Rei Dominguez MD     Allergies:  Patient has no known allergies.    I have reviewed the patients medical history, surgical history and family history in the available medical record system.     Current Medications:     Current Facility-Administered Medications   Medication Dose Route Frequency Provider Last Rate Last Admin   • dextrose 5 % and sodium chloride 0.45 % infusion  30 mL/hr Intravenous Continuous PRN Rory Carias DO 30 mL/hr at 08/25/22 1240 30 mL/hr at 08/25/22 1240       Objective     Physical Exam:   Temp:  [97.7 °F (36.5 °C)] 97.7 °F (36.5 °C)  Heart Rate:  [56]  56  Resp:  [18] 18  BP: (146)/(91) 146/91    Physical Exam:  General Appearance:    Alert, cooperative, in no acute distress   Head:    Normocephalic, without obvious abnormality, atraumatic   Eyes:            Lids and lashes normal, conjunctivae and sclerae normal, no icterus, no pallor, corneas clear, PERRLA   Ears:    Ears appear intact with no abnormalities noted   Throat:   No oral lesions, no thrush, oral mucosa moist   Neck:   No adenopathy, supple, trachea midline, no thyromegaly, no  carotid bruit, no JVD   Back:     No kyphosis present, no scoliosis present, no skin lesions,   erythema or scars, no tenderness to percussion or                 palpation,  range of motion normal   Lungs:     Clear to auscultation,respirations regular, even and         unlabored    Heart:    Regular rhythm and normal rate, normal S1 and S2, no  murmur, no gallop, no rub, no click   Breast Exam:    Deferred   Abdomen:     Normal bowel sounds, no masses, no organomegaly, soft  nontender, nondistended, no guarding, no rebound                 tenderness   Genitalia:    Deferred   Extremities:   Moves all extremities well, no edema, no cyanosis, no          redness   Pulses:   Pulses palpable and equal bilaterally   Skin:   No bleeding, bruising or rash   Lymph nodes:   No palpable adenopathy   Neurologic:   Cranial nerves 2 - 12 grossly intact, sensation intact, DTR     present and equal bilaterally      Results Review:     No results found for: WBC, HGB, HCT, PLT          No results found for: LIPASE  No results found for: INR  No results found for: RESPCX    Radiology Review:  Imaging Results (Last 72 Hours)     ** No results found for the last 72 hours. **           I reviewed the patient's new clinical results.  I reviewed the patient's new imaging results and agree with the interpretation.     ASSESSMENT/PLAN:   ASSESSMENT:  1.  Dyspepsia  2.  Acid reflux  3.  Screen for colon cancer    PLAN:  1.  Esophagogastroduodenoscopy  with biopsy and culture  2.  Colonoscopy    Risk and benefits associated with the procedure are reviewed with the patient.  The patient wished to proceed       Rory Carias DO  08/25/22  13:17 CDT

## 2022-08-25 NOTE — ANESTHESIA PREPROCEDURE EVALUATION
Anesthesia Evaluation     Patient summary reviewed and Nursing notes reviewed   NPO Solid Status: > 8 hours  NPO Liquid Status: > 8 hours           Airway   Mallampati: II  TM distance: >3 FB  Neck ROM: full  No difficulty expected  Dental    (+) poor dentition    Pulmonary     breath sounds clear to auscultation  (+) a smoker Former,   Cardiovascular     Rhythm: regular  Rate: normal    (+) hypertension well controlled less than 2 medications,       Neuro/Psych  (+) psychiatric history Anxiety,    GI/Hepatic/Renal/Endo    (+)  GERD well controlled,      Musculoskeletal     Abdominal     Abdomen: tender.   Substance History      OB/GYN          Other                          Anesthesia Plan    ASA 2     MAC     intravenous induction     Anesthetic plan, risks, benefits, and alternatives have been provided, discussed and informed consent has been obtained with: patient.

## 2022-08-25 NOTE — ANESTHESIA POSTPROCEDURE EVALUATION
Patient: Manuel Guzman    Procedure Summary     Date: 08/25/22 Room / Location: [facility] ENDOSCOPY 2 / Health system ENDOSCOPY    Anesthesia Start: 1319 Anesthesia Stop: 1347    Procedures:       ESOPHAGOGASTRODUODENOSCOPY (N/A )      COLONOSCOPY 12:30 (N/A ) Diagnosis:       Encounter for screening for malignant neoplasm of colon      Esophageal reflux      (Encounter for screening for malignant neoplasm of colon [Z12.11])    Surgeons: Rory Carias DO Provider: Efren Curry CRNA    Anesthesia Type: MAC ASA Status: 2          Anesthesia Type: MAC    Vitals  No vitals data found for the desired time range.          Post Anesthesia Care and Evaluation    Patient location during evaluation: bedside  Patient participation: complete - patient participated  Level of consciousness: sleepy but conscious  Pain score: 0  Pain management: adequate    Airway patency: patent  Anesthetic complications: No anesthetic complications  PONV Status: none  Cardiovascular status: acceptable  Respiratory status: acceptable  Hydration status: acceptable    Comments: ---------------------------               08/25/22                      1347         ---------------------------   BP:          146/91         Pulse:         56           Resp:          18           Temp:   97.7 °F (36.5 °C)   SpO2:          99%         ---------------------------

## 2022-08-26 LAB — REF LAB TEST METHOD: NORMAL

## 2022-08-27 LAB
BACTERIA SPEC AEROBE CULT: ABNORMAL

## 2022-11-09 ENCOUNTER — TELEPHONE (OUTPATIENT)
Dept: CARDIOLOGY | Facility: CLINIC | Age: 62
End: 2022-11-09

## 2022-11-09 DIAGNOSIS — I10 BENIGN ESSENTIAL HTN: ICD-10-CM

## 2022-11-09 RX ORDER — METOPROLOL SUCCINATE 50 MG/1
50 TABLET, EXTENDED RELEASE ORAL DAILY
Qty: 30 TABLET | Refills: 2 | Status: SHIPPED | OUTPATIENT
Start: 2022-11-09 | End: 2023-02-06

## 2023-02-04 DIAGNOSIS — I10 BENIGN ESSENTIAL HTN: ICD-10-CM

## 2023-02-06 RX ORDER — METOPROLOL SUCCINATE 50 MG/1
TABLET, EXTENDED RELEASE ORAL
Qty: 90 TABLET | Refills: 0 | Status: SHIPPED | OUTPATIENT
Start: 2023-02-06

## 2023-04-25 ENCOUNTER — OFFICE VISIT (OUTPATIENT)
Dept: CARDIOLOGY | Facility: CLINIC | Age: 63
End: 2023-04-25
Payer: COMMERCIAL

## 2023-04-25 VITALS
DIASTOLIC BLOOD PRESSURE: 78 MMHG | OXYGEN SATURATION: 99 % | TEMPERATURE: 98 F | HEART RATE: 56 BPM | BODY MASS INDEX: 27.08 KG/M2 | WEIGHT: 182.8 LBS | SYSTOLIC BLOOD PRESSURE: 130 MMHG | HEIGHT: 69 IN

## 2023-04-25 DIAGNOSIS — I10 BENIGN ESSENTIAL HTN: Primary | ICD-10-CM

## 2023-04-25 DIAGNOSIS — I10 BENIGN ESSENTIAL HTN: ICD-10-CM

## 2023-04-25 DIAGNOSIS — R00.2 PALPITATIONS: ICD-10-CM

## 2023-04-25 LAB
QT INTERVAL: 424 MS
QTC INTERVAL: 409 MS

## 2023-04-25 PROCEDURE — 99214 OFFICE O/P EST MOD 30 MIN: CPT | Performed by: INTERNAL MEDICINE

## 2023-04-25 PROCEDURE — 93000 ELECTROCARDIOGRAM COMPLETE: CPT | Performed by: INTERNAL MEDICINE

## 2023-04-25 RX ORDER — METOPROLOL SUCCINATE 50 MG/1
50 TABLET, EXTENDED RELEASE ORAL DAILY
Qty: 90 TABLET | Refills: 3 | Status: SHIPPED | OUTPATIENT
Start: 2023-04-25

## 2023-04-25 NOTE — PROGRESS NOTES
Manuel Guzman  62 y.o. male    04/25/2023  1. Benign essential HTN    2. Palpitations        History of Present Illness  Manuel Guzman is a 62-year-old male who is being seen by me on the first time.  He was a patient of Dr. Jarrett in the past.  His history is remarkable for evaluation for palpitation/PVCs for which she was started on metoprolol succinate 50 mg daily.  He seems to responded well to this and his episodes of skipped beats are few and far between, usually triggered by stressful situations.  He denies any chest pain or shortness of breath and for the most part able to perform all his activities of daily living.    Echocardiogram in February 2020 showed:  • Estimated EF appears to be in the range of 56 - 60%. Normal left ventricular cavity size and wall thickness noted. All left ventricular wall segments contract normally    Exercise treadmill stress test and February 2020 showed no evidence of ischemia.  Rare PVCs noted.  Accelerated blood pressure response.    He does have some degree of fatigue.  His heart rate is 56 bpm and blood pressure 130/78 mmHg.  Clinical exam was otherwise unremarkable.    EKG today showed sinus bradycardia with heart rate of 56 bpm.  Baseline artifact.  QTc interval 409 ms.    SUBJECTIVE    No Known Allergies      Past Medical History:   Diagnosis Date   • Arrhythmia    • GERD (gastroesophageal reflux disease)    • History of stomach ulcers    • Hypertension          Past Surgical History:   Procedure Laterality Date   • CHOLECYSTECTOMY WITH INTRAOPERATIVE CHOLANGIOGRAM N/A 4/6/2018    Procedure: LAPAROSCOPIC POSSIBLE OPEN CHOLECYSTECTOMY WITH INTRAOPERATIVE CHOLANGIOGRAM         (C-ARM#1);  Surgeon: Tyron Gonzalez MD;  Location: [facility] OR;  Service: General   • COLONOSCOPY     • COLONOSCOPY N/A 8/25/2022    Procedure: COLONOSCOPY 12:30;  Surgeon: Rory Carias DO;  Location: [facility] ENDOSCOPY;  Service: Gastroenterology;  Laterality: N/A;   • CYST REMOVAL       ganglion right wrist   • ENDOSCOPY  2012    95542 (1)    Normal hypopharynx, esophagus, GE junction, duodenum, symmetrical & patent pylorus. Ulcers in antrum have healed. Thereis mild erythema that is present in antrum. Multiple biopsies were performed. 2012    n/a    • ENDOSCOPY N/A 2022    Procedure: ESOPHAGOGASTRODUODENOSCOPY;  Surgeon: Rory Carias DO;  Location: [facility] ENDOSCOPY;  Service: Gastroenterology;  Laterality: N/A;         History reviewed. No pertinent family history.      Social History     Socioeconomic History   • Marital status:    Tobacco Use   • Smoking status: Former     Types: Cigarettes     Quit date: 1988     Years since quittin.0   • Smokeless tobacco: Former     Quit date: 1988   Vaping Use   • Vaping Use: Never used   Substance and Sexual Activity   • Alcohol use: No   • Drug use: No   • Sexual activity: Defer         Current Outpatient Medications   Medication Sig Dispense Refill   • aspirin 81 MG EC tablet Take 1 tablet by mouth Daily.     • Ca & Phos-Vit D-Mag (CALCIUM) 732--133 tablet Take  by mouth.     • Cholecalciferol (VITAMIN D) 125 MCG (5000 UT) capsule capsule Take 1 capsule by mouth Daily.     • FISH OIL-KRILL OIL PO Take 3 capsules by mouth Daily.     • fluticasone (FLONASE) 50 MCG/ACT nasal spray 2 sprays into the nostril(s) as directed by provider Daily.     • Ketotifen Fumarate (ALAWAY OP) Administer 1 drop to both eyes 2 (Two) Times a Day.     • Magnesium 125 MG capsule Take  by mouth.     • metoprolol succinate XL (TOPROL-XL) 50 MG 24 hr tablet TAKE 1 TABLET BY MOUTH EVERY DAY 90 tablet 0   • Multiple Vitamins-Minerals (MULTIVITAMIN MEN 50+ PO) Take 1 tablet by mouth Daily.     • omeprazole (priLOSEC) 20 MG capsule Take 1 capsule by mouth.     • Potassium Gluconate 550 (90 K) MG tablet Take  by mouth.     • Zinc 10 MG lozenge Dissolve 5 mg in the mouth.       No current facility-administered medications for this visit.  "        OBJECTIVE    /78 (BP Location: Left arm, Patient Position: Sitting, Cuff Size: Adult)   Pulse 56   Temp 98 °F (36.7 °C)   Ht 175.3 cm (69\")   Wt 82.9 kg (182 lb 12.8 oz)   SpO2 99%   BMI 26.99 kg/m²         Review of Systems     Constitutional:  Denies recent weight loss, weight gain, fever or chills.  Has fatigue     HENT:  Denies any hearing loss, epistaxis, hoarseness, or difficulty speaking.     Eyes: Wears eyeglasses or contact lenses     Respiratory:  Denies dyspnea with exertion, no cough, wheezing, or hemoptysis.     Cardiovascular: Negative for palpitations, chest pain, orthopnea, PND, peripheral edema, syncope, or claudication.     Gastrointestinal:  Denies change in bowel habits, dyspepsia, ulcer disease, hematochezia, or melena.     Endocrine: Negative for cold intolerance, heat intolerance, polydipsia, polyphagia and polyuria.     Genitourinary: Negative.      Musculoskeletal: Denies any history of arthritic symptoms or back problems     Skin:  Denies any change in hair or nails, rashes, or skin lesions.     Allergic/Immunologic: Negative.  Negative for environmental allergies, food allergies and/or immunocompromised state.     Neurological:  Denies any history of recurrent headaches, strokes, TIA, or seizure disorder.     Hematological: Denies any food allergies, seasonal allergies, bleeding disorders, or lymphadenopathy.     Psychiatric/Behavioral: Denies any history of depression, substance abuse, or change in cognitive function.         Physical Exam     Constitutional: Cooperative, alert and oriented, well-developed, well-nourished, in no acute distress.  BMI 27    HENT:   Head: Normocephalic, normal hair patterns, no masses or tenderness.  Ears, Nose, and Throat: No gross abnormalities. No pallor or cyanosis. Dentition good.   Eyes: EOMS intact, PERRL, conjunctivae and lids unremarkable. Fundoscopic exam and visual fields not performed.   Neck: No palpable masses or adenopathy, " no thyromegaly, no JVD, carotid pulses are full and equal bilaterally and without bruit.     Cardiovascular: Regular rhythm, S1 and S2 normal, no S3 or S4.  No murmurs, gallops, or rubs detected.     Pulmonary/Chest: Chest: normal symmetry, normal respiratory excursion, no intercostal retraction, no use of accessory muscles.     Pulmonary: Normal breath sounds. No rales or rhonchi.    Abdominal: Abdomen soft, bowel sounds normoactive, no masses, no hepatosplenomegaly, nontender, no bruit.     Musculoskeletal: No deformities, clubbing, cyanosis, erythema, or edema observed. There are no spinal abnormalities noted. Normal muscle strength and tone. Pulses full and equal in all extremities, no bruit auscultated.     Neurological: No gross motor or sensory deficits noted, affect appropriate, oriented to time, person, place.     Skin: Warm and dry to the touch, no apparent skin lesion or mass noted.     Psychiatric: He has a normal mood and affect. His behavior is normal. Judgment and thought content normal.         Procedures      No results found for: WBC, HGB, HCT, MCV, PLT  Lab Results   Component Value Date    GLUCOSE 79 02/06/2020    BUN 10 09/08/2022    CREATININE 1.0 09/08/2022    EGFRIFNONA 69 02/06/2020    BCR 11.0 02/06/2020    CO2 24 09/08/2022    CALCIUM 8.7 09/08/2022    ALBUMIN 4.2 09/08/2022    AST 17 09/08/2022    ALT 14 09/08/2022     Lab Results   Component Value Date    CHOL 167 02/06/2020     Lab Results   Component Value Date    TRIG 90 09/08/2022    TRIG 86 02/06/2020     Lab Results   Component Value Date    HDL 42 09/08/2022    HDL 46 02/06/2020     No components found for: LDLCALC  Lab Results   Component Value Date     09/08/2022     (H) 02/06/2020     No results found for: HDLLDLRATIO  No components found for: CHOLHDL  No results found for: HGBA1C  No results found for: TSH, N1XHOTF, T4JJUNY, THYROIDAB        ASSESSMENT AND PLAN  Manuel Guzman is a 62-year-old male with history of  hypertension, palpitation is stable at the present time with no clinical evidence of angina, arrhythmia or congestive heart failure.  He has tolerated metoprolol succinate quite well.  His blood pressure was in the normal range.  His LDL was 100 and HDL 42 back in September 2022.  He will have lab work at work and his primary care physician in the near future.  To further investigate his fatigue, I believe he will benefit from thyroid function testing and vitamin D levels which he will address with his primary care physician.    No changes in medications been made.  I have continued antiplatelet therapy with aspirin and metoprolol succinate 50 mg daily for now.  He will be seen on a yearly basis or sooner if the clinical situation changes.    Diagnoses and all orders for this visit:    1. Benign essential HTN (Primary)  -     ECG 12 Lead    2. Palpitations        BMI is >= 25 and <30. (Overweight) The following options were offered after discussion;: nutrition counseling/recommendations      Manuel Guzman  reports that he quit smoking about 35 years ago. His smoking use included cigarettes. He quit smokeless tobacco use about 35 years ago.             Terence Swan MD  4/25/2023  08:22 CDT

## (undated) DEVICE — MONOPOLAR METZENBAUM SCISSOR TIP, DISPOSABLE: Brand: MONOPOLAR METZENBAUM SCISSOR TIP, DISPOSABLE

## (undated) DEVICE — PK LAP CHOLE LF 60

## (undated) DEVICE — GLV SURG TRIUMPH LT PF LTX 7.5 STRL

## (undated) DEVICE — SINGLE-USE BIOPSY FORCEPS: Brand: RADIAL JAW 4

## (undated) DEVICE — CANN SMPL SOFTECH BIFLO ETCO2 A/M 7FT

## (undated) DEVICE — GLV SURG SENSICARE GREEN W/ALOE PF LF 7 STRL

## (undated) DEVICE — VISUALIZATION SYSTEM: Brand: CLEARIFY

## (undated) DEVICE — 3M™ STERI-STRIP™ REINFORCED ADHESIVE SKIN CLOSURES, R1547, 1/2 IN X 4 IN (12 MM X 100 MM), 6 STRIPS/ENVELOPE: Brand: 3M™ STERI-STRIP™

## (undated) DEVICE — THE KUMAR CATHETER®, USED IN CONJUNCTION WITH KUMAR CHOLANGIOGRAPHY® CLAMP, IS MEANT TO PROVIDE A MEANS OF LAPAROSCOPIC CHOLANGIOGRAPHY. IT COMPRISES A TRANSLUCENT TUBING ( 76 CM. LENGTH AND 16 GA. ) THAT CARRIES A 19 GA., 1.25 CM LONG NEEDLE AT THE END. THE KUMAR CATHETER® IS USED TO PUNCTURE THE HARTMANN'S POUCH OF THE GALLBLADDER FOR BILIARY ACCESS AND / OR ASPIRATION. PRODUCT IS LATEX FREE.: Brand: KUMAR CATHETER®

## (undated) DEVICE — RETRV BG SPECI ENDOBAG 3X6IN 20.9CM

## (undated) DEVICE — SOL IRRIG NACL 1000ML

## (undated) DEVICE — GLV SURG SENSICARE GREEN W/ALOE PF LF 8 STRL

## (undated) DEVICE — BITEBLOCK ENDO W/STRAP 60F A/ LF DISP

## (undated) DEVICE — DECANT BG O JET

## (undated) DEVICE — ANTIBACTERIAL UNDYED BRAIDED (POLYGLACTIN 910), SYNTHETIC ABSORBABLE SUTURE: Brand: COATED VICRYL

## (undated) DEVICE — ENDOPATH XCEL BLADELESS TROCARS WITH STABILITY SLEEVES: Brand: ENDOPATH XCEL

## (undated) DEVICE — GLV SURG TRIUMPH PF LTX 6.5 STRL

## (undated) DEVICE — ADHS LIQ MASTISOL 2/3ML